# Patient Record
Sex: FEMALE | URBAN - METROPOLITAN AREA
[De-identification: names, ages, dates, MRNs, and addresses within clinical notes are randomized per-mention and may not be internally consistent; named-entity substitution may affect disease eponyms.]

---

## 2019-12-18 ENCOUNTER — TELEPHONE (OUTPATIENT)
Dept: GASTROENTEROLOGY | Age: 79
End: 2019-12-18

## 2024-06-24 ENCOUNTER — HOSPITAL ENCOUNTER (OUTPATIENT)
Age: 84
Setting detail: OBSERVATION
Discharge: HOME | End: 2024-06-26
Attending: EMERGENCY MEDICINE
Payer: MEDICARE

## 2024-06-24 ENCOUNTER — APPOINTMENT (OUTPATIENT)
Dept: CT IMAGING | Age: 84
End: 2024-06-24
Payer: MEDICARE

## 2024-06-24 DIAGNOSIS — K92.2 GASTROINTESTINAL HEMORRHAGE, UNSPECIFIED GASTROINTESTINAL HEMORRHAGE TYPE: Primary | ICD-10-CM

## 2024-06-24 PROBLEM — K62.5 BLOOD PER RECTUM: Status: ACTIVE | Noted: 2024-06-24

## 2024-06-24 LAB
ALBUMIN SERPL-MCNC: 4.5 G/DL (ref 3.5–5.2)
ALBUMIN/GLOB SERPL: 1.3 {RATIO} (ref 1–2.5)
ALP SERPL-CCNC: 78 U/L (ref 35–104)
ALT SERPL-CCNC: 15 U/L (ref 5–33)
ANION GAP SERPL CALCULATED.3IONS-SCNC: 12 MMOL/L (ref 9–17)
AST SERPL-CCNC: 16 U/L
BASOPHILS # BLD: 0.1 K/UL (ref 0–0.2)
BASOPHILS NFR BLD: 1 % (ref 0–2)
BILIRUB SERPL-MCNC: 0.4 MG/DL (ref 0.3–1.2)
BUN SERPL-MCNC: 17 MG/DL (ref 8–23)
CALCIUM SERPL-MCNC: 10.2 MG/DL (ref 8.6–10.4)
CHLORIDE SERPL-SCNC: 104 MMOL/L (ref 98–107)
CO2 SERPL-SCNC: 26 MMOL/L (ref 20–31)
CREAT SERPL-MCNC: 0.7 MG/DL (ref 0.5–0.9)
EOSINOPHIL # BLD: 0.1 K/UL (ref 0–0.4)
EOSINOPHILS RELATIVE PERCENT: 1 % (ref 1–4)
ERYTHROCYTE [DISTWIDTH] IN BLOOD BY AUTOMATED COUNT: 15.5 % (ref 12.5–15.4)
GFR, ESTIMATED: 85 ML/MIN/1.73M2
GLUCOSE SERPL-MCNC: 112 MG/DL (ref 70–99)
HCT VFR BLD AUTO: 42.3 % (ref 36–46)
HGB BLD-MCNC: 14.4 G/DL (ref 12–16)
LIPASE SERPL-CCNC: 10 U/L (ref 13–60)
LYMPHOCYTES NFR BLD: 1.6 K/UL (ref 1–4.8)
LYMPHOCYTES RELATIVE PERCENT: 18 % (ref 24–44)
MCH RBC QN AUTO: 27.3 PG (ref 26–34)
MCHC RBC AUTO-ENTMCNC: 33.9 G/DL (ref 31–37)
MCV RBC AUTO: 80.4 FL (ref 80–100)
MONOCYTES NFR BLD: 0.8 K/UL (ref 0.1–1.2)
MONOCYTES NFR BLD: 9 % (ref 2–11)
NEUTROPHILS NFR BLD: 71 % (ref 36–66)
NEUTS SEG NFR BLD: 6.1 K/UL (ref 1.8–7.7)
PLATELET # BLD AUTO: 234 K/UL (ref 140–450)
PMV BLD AUTO: 7.2 FL (ref 6–12)
POTASSIUM SERPL-SCNC: 4.4 MMOL/L (ref 3.7–5.3)
PROT SERPL-MCNC: 8.1 G/DL (ref 6.4–8.3)
RBC # BLD AUTO: 5.27 M/UL (ref 4–5.2)
SODIUM SERPL-SCNC: 142 MMOL/L (ref 135–144)
TROPONIN I SERPL HS-MCNC: 13 NG/L (ref 0–14)
WBC OTHER # BLD: 8.7 K/UL (ref 3.5–11)

## 2024-06-24 PROCEDURE — 6360000002 HC RX W HCPCS: Performed by: EMERGENCY MEDICINE

## 2024-06-24 PROCEDURE — 2580000003 HC RX 258: Performed by: EMERGENCY MEDICINE

## 2024-06-24 PROCEDURE — 85025 COMPLETE CBC W/AUTO DIFF WBC: CPT

## 2024-06-24 PROCEDURE — 6360000004 HC RX CONTRAST MEDICATION

## 2024-06-24 PROCEDURE — 36415 COLL VENOUS BLD VENIPUNCTURE: CPT

## 2024-06-24 PROCEDURE — 99285 EMERGENCY DEPT VISIT HI MDM: CPT

## 2024-06-24 PROCEDURE — 2580000003 HC RX 258

## 2024-06-24 PROCEDURE — 83690 ASSAY OF LIPASE: CPT

## 2024-06-24 PROCEDURE — 74177 CT ABD & PELVIS W/CONTRAST: CPT

## 2024-06-24 PROCEDURE — 93005 ELECTROCARDIOGRAM TRACING: CPT | Performed by: EMERGENCY MEDICINE

## 2024-06-24 PROCEDURE — 80053 COMPREHEN METABOLIC PANEL: CPT

## 2024-06-24 PROCEDURE — 84484 ASSAY OF TROPONIN QUANT: CPT

## 2024-06-24 RX ORDER — LOPERAMIDE HYDROCHLORIDE 2 MG/1
2 CAPSULE ORAL PRN
COMMUNITY

## 2024-06-24 RX ORDER — GLIMEPIRIDE 2 MG/1
2 TABLET ORAL 2 TIMES DAILY
COMMUNITY

## 2024-06-24 RX ORDER — SODIUM CHLORIDE 0.9 % (FLUSH) 0.9 %
10 SYRINGE (ML) INJECTION 2 TIMES DAILY
Status: DISCONTINUED | OUTPATIENT
Start: 2024-06-24 | End: 2024-06-26 | Stop reason: HOSPADM

## 2024-06-24 RX ORDER — 0.9 % SODIUM CHLORIDE 0.9 %
1000 INTRAVENOUS SOLUTION INTRAVENOUS ONCE
Status: COMPLETED | OUTPATIENT
Start: 2024-06-24 | End: 2024-06-24

## 2024-06-24 RX ORDER — 0.9 % SODIUM CHLORIDE 0.9 %
80 INTRAVENOUS SOLUTION INTRAVENOUS ONCE
Status: DISCONTINUED | OUTPATIENT
Start: 2024-06-24 | End: 2024-06-26

## 2024-06-24 RX ORDER — METOPROLOL SUCCINATE 50 MG/1
50 TABLET, EXTENDED RELEASE ORAL DAILY
Status: ON HOLD | COMMUNITY
End: 2024-06-25

## 2024-06-24 RX ORDER — AMLODIPINE BESYLATE 2.5 MG/1
2.5 TABLET ORAL DAILY
COMMUNITY

## 2024-06-24 RX ORDER — VALSARTAN 320 MG/1
320 TABLET ORAL DAILY
Status: ON HOLD | COMMUNITY
End: 2024-06-26 | Stop reason: HOSPADM

## 2024-06-24 RX ORDER — ASPIRIN 81 MG/1
81 TABLET, CHEWABLE ORAL DAILY
COMMUNITY

## 2024-06-24 RX ADMIN — Medication 80 ML: at 20:43

## 2024-06-24 RX ADMIN — SODIUM CHLORIDE 1000 ML: 9 INJECTION, SOLUTION INTRAVENOUS at 20:34

## 2024-06-24 RX ADMIN — IOPAMIDOL 75 ML: 755 INJECTION, SOLUTION INTRAVENOUS at 20:42

## 2024-06-24 RX ADMIN — PIPERACILLIN AND TAZOBACTAM 3375 MG: 3; .375 INJECTION, POWDER, LYOPHILIZED, FOR SOLUTION INTRAVENOUS at 23:53

## 2024-06-24 RX ADMIN — SODIUM CHLORIDE, PRESERVATIVE FREE 10 ML: 5 INJECTION INTRAVENOUS at 20:43

## 2024-06-24 ASSESSMENT — ENCOUNTER SYMPTOMS
EYES NEGATIVE: 1
CONSTIPATION: 0
VOMITING: 0
DIARRHEA: 1
ABDOMINAL DISTENTION: 0
RECTAL PAIN: 0
NAUSEA: 1
ABDOMINAL PAIN: 0
ALLERGIC/IMMUNOLOGIC NEGATIVE: 1
RESPIRATORY NEGATIVE: 1
BLOOD IN STOOL: 1
ANAL BLEEDING: 0

## 2024-06-24 ASSESSMENT — PAIN - FUNCTIONAL ASSESSMENT: PAIN_FUNCTIONAL_ASSESSMENT: NONE - DENIES PAIN

## 2024-06-24 NOTE — ED PROVIDER NOTES
[address]  Emergency Department Encounter  Mid Level Provider     Pt Name: Barbie Arevalo  MRN: [id number]  Birthdate 1940  Date of evaluation: 6/24/24  PCP:  Mark Bautista MD    CHIEF COMPLAINT       Chief Complaint   Patient presents with    Fatigue     Onset about 5 days ago.  Pt complains of feeling generalized weakness.  Also, pt describes rectal bleeding x1 event today with bowel movement.        HISTORY OF PRESENT ILLNESS  (Location/Symptom, Timing/Onset,Context/Setting, Quality, Duration, Modifying Factors, Severity.)      Barbie Arevalo is a 83 y.o. female who presents with complaints of fatigue and feeling unwell, chills, without fever, no nausea, intermittent myalgias. She has also had increase in diarrhea, today she noted kailyn red blood mixed with her stool, she reports it filled the entire toilet bowl.     She also notes a decrease in appetite as well as decrease in urination, denies any hematuria or dysuria.     She has history of colorectal cancer with chemo/radiation and partial colectomy 20 years ago.     PAST MEDICAL /SURGICAL / SOCIAL / FAMILY HISTORY      has a past medical history of Diabetes 1.5, managed as type 2 (HCC) and Hypertension.     has no past surgical history on file.    Social History     Socioeconomic History    Marital status: Unknown     Spouse name: Not on file    Number of children: Not on file    Years of education: Not on file    Highest education level: Not on file   Occupational History    Not on file   Tobacco Use    Smoking status: Never    Smokeless tobacco: Never   Vaping Use    Vaping Use: Never used   Substance and Sexual Activity    Alcohol use: Not on file    Drug use: Never    Sexual activity: Not on file   Other Topics Concern    Not on file   Social History Narrative    Not on file     Social Determinants of Health     Financial Resource Strain: Not on file   Food Insecurity: No Food Insecurity (6/25/2024)    Hunger Vital Sign     Worried  physician who will make final medical decision making, consultation, reevaluation and disposition.    Plan (Labs/Imaging/EKG):  Orders Placed This Encounter   Procedures    CT ABDOMEN PELVIS W IV CONTRAST Additional Contrast? None    MRI ABDOMEN W WO CONTRAST    CMP    CBC with Auto Differential    Lipase    Iron and TIBC    Basic Metabolic Panel w/ Reflex to MG    Hemoglobin and Hematocrit    Hemoglobin A1c    Basic Metabolic Panel w/ Reflex to MG    CBC with Auto Differential    CEA    Inpatient consult to GI    POC Glucose Fingerstick    POC Glucose Fingerstick    POC Glucose Fingerstick    POC Glucose Fingerstick    POC Glucose Fingerstick    EKG 12 Lead    Saline lock IV    Place in Observation Service    Discharge patient       Medications Ordered:  Orders Placed This Encounter   Medications    sodium chloride 0.9 % bolus 1,000 mL    DISCONTD: sodium chloride 0.9 % bolus 80 mL    iopamidol (ISOVUE-370) 76 % injection 75 mL    DISCONTD: sodium chloride flush 0.9 % injection 10 mL    piperacillin-tazobactam (ZOSYN) 3,375 mg in sodium chloride 0.9 % 50 mL IVPB (mini-bag)     Order Specific Question:   Antimicrobial Indications     Answer:   Intra-Abdominal Infection    DISCONTD: sodium chloride flush 0.9 % injection 5-40 mL    DISCONTD: sodium chloride flush 0.9 % injection 5-40 mL    DISCONTD: 0.9 % sodium chloride infusion    DISCONTD: ondansetron (ZOFRAN-ODT) disintegrating tablet 4 mg    DISCONTD: ondansetron (ZOFRAN) injection 4 mg    DISCONTD: acetaminophen (TYLENOL) tablet 650 mg    DISCONTD: acetaminophen (TYLENOL) suppository 650 mg    DISCONTD: glucose chewable tablet 16 g    DISCONTD: dextrose bolus 10% 125 mL    DISCONTD: dextrose bolus 10% 250 mL    DISCONTD: glucagon injection 1 mg    DISCONTD: dextrose 10 % infusion    DISCONTD: insulin lispro (HUMALOG,ADMELOG) injection vial 0-4 Units    DISCONTD: hydrALAZINE (APRESOLINE) injection 10 mg    DISCONTD: pantoprazole (PROTONIX) 40 mg in sodium

## 2024-06-25 ENCOUNTER — APPOINTMENT (OUTPATIENT)
Dept: MRI IMAGING | Age: 84
End: 2024-06-25
Payer: MEDICARE

## 2024-06-25 PROBLEM — K92.2 GASTROINTESTINAL HEMORRHAGE: Status: ACTIVE | Noted: 2024-06-25

## 2024-06-25 PROBLEM — E11.9 TYPE 2 DIABETES MELLITUS (HCC): Status: ACTIVE | Noted: 2024-06-25

## 2024-06-25 PROBLEM — E78.2 MIXED HYPERLIPIDEMIA: Status: ACTIVE | Noted: 2024-06-25

## 2024-06-25 PROBLEM — I49.1 SUPRAVENTRICULAR PREMATURE BEATS: Status: ACTIVE | Noted: 2024-06-25

## 2024-06-25 PROBLEM — K58.0 IRRITABLE BOWEL SYNDROME WITH DIARRHEA: Status: ACTIVE | Noted: 2024-06-25

## 2024-06-25 PROBLEM — E11.65 TYPE 2 DIABETES MELLITUS WITH HYPERGLYCEMIA (HCC): Status: ACTIVE | Noted: 2024-06-25

## 2024-06-25 PROBLEM — I10 PRIMARY HYPERTENSION: Status: ACTIVE | Noted: 2024-06-25

## 2024-06-25 PROBLEM — M19.90 ARTHRITIS: Status: ACTIVE | Noted: 2024-06-25

## 2024-06-25 PROBLEM — F41.9 ANXIETY: Status: ACTIVE | Noted: 2024-06-25

## 2024-06-25 LAB
ANION GAP SERPL CALCULATED.3IONS-SCNC: 12 MMOL/L (ref 9–17)
BUN SERPL-MCNC: 14 MG/DL (ref 8–23)
CALCIUM SERPL-MCNC: 9.2 MG/DL (ref 8.6–10.4)
CHLORIDE SERPL-SCNC: 104 MMOL/L (ref 98–107)
CO2 SERPL-SCNC: 26 MMOL/L (ref 20–31)
CREAT SERPL-MCNC: 0.7 MG/DL (ref 0.5–0.9)
EKG Q-T INTERVAL: 438 MS
EKG QRS DURATION: 92 MS
EKG QTC CALCULATION (BAZETT): 455 MS
EKG R AXIS: -5 DEGREES
EKG T AXIS: 64 DEGREES
EKG VENTRICULAR RATE: 65 BPM
EST. AVERAGE GLUCOSE BLD GHB EST-MCNC: 140 MG/DL
GFR, ESTIMATED: 85 ML/MIN/1.73M2
GLUCOSE BLD-MCNC: 130 MG/DL (ref 65–105)
GLUCOSE BLD-MCNC: 140 MG/DL (ref 65–105)
GLUCOSE SERPL-MCNC: 135 MG/DL (ref 70–99)
HBA1C MFR BLD: 6.5 % (ref 4–6)
HCT VFR BLD AUTO: 39.8 % (ref 36–46)
HCT VFR BLD AUTO: 40 % (ref 36–46)
HCT VFR BLD AUTO: 41.6 % (ref 36–46)
HGB BLD-MCNC: 13.4 G/DL (ref 12–16)
HGB BLD-MCNC: 13.5 G/DL (ref 12–16)
HGB BLD-MCNC: 13.7 G/DL (ref 12–16)
IRON SATN MFR SERPL: 23 % (ref 20–55)
IRON SERPL-MCNC: 62 UG/DL (ref 37–145)
POTASSIUM SERPL-SCNC: 4.1 MMOL/L (ref 3.7–5.3)
SODIUM SERPL-SCNC: 142 MMOL/L (ref 135–144)
TIBC SERPL-MCNC: 268 UG/DL (ref 250–450)
UNSATURATED IRON BINDING CAPACITY: 206 UG/DL (ref 112–347)

## 2024-06-25 PROCEDURE — 99222 1ST HOSP IP/OBS MODERATE 55: CPT | Performed by: NURSE PRACTITIONER

## 2024-06-25 PROCEDURE — 96375 TX/PRO/DX INJ NEW DRUG ADDON: CPT

## 2024-06-25 PROCEDURE — 96365 THER/PROPH/DIAG IV INF INIT: CPT

## 2024-06-25 PROCEDURE — G0378 HOSPITAL OBSERVATION PER HR: HCPCS

## 2024-06-25 PROCEDURE — A9579 GAD-BASE MR CONTRAST NOS,1ML: HCPCS | Performed by: STUDENT IN AN ORGANIZED HEALTH CARE EDUCATION/TRAINING PROGRAM

## 2024-06-25 PROCEDURE — APPSS30 APP SPLIT SHARED TIME 16-30 MINUTES

## 2024-06-25 PROCEDURE — C9113 INJ PANTOPRAZOLE SODIUM, VIA: HCPCS

## 2024-06-25 PROCEDURE — 83550 IRON BINDING TEST: CPT

## 2024-06-25 PROCEDURE — 6360000002 HC RX W HCPCS

## 2024-06-25 PROCEDURE — 74183 MRI ABD W/O CNTR FLWD CNTR: CPT

## 2024-06-25 PROCEDURE — 80048 BASIC METABOLIC PNL TOTAL CA: CPT

## 2024-06-25 PROCEDURE — 85018 HEMOGLOBIN: CPT

## 2024-06-25 PROCEDURE — 96366 THER/PROPH/DIAG IV INF ADDON: CPT

## 2024-06-25 PROCEDURE — 82947 ASSAY GLUCOSE BLOOD QUANT: CPT

## 2024-06-25 PROCEDURE — 83540 ASSAY OF IRON: CPT

## 2024-06-25 PROCEDURE — 6360000004 HC RX CONTRAST MEDICATION: Performed by: STUDENT IN AN ORGANIZED HEALTH CARE EDUCATION/TRAINING PROGRAM

## 2024-06-25 PROCEDURE — 83036 HEMOGLOBIN GLYCOSYLATED A1C: CPT

## 2024-06-25 PROCEDURE — 2580000003 HC RX 258

## 2024-06-25 PROCEDURE — 99222 1ST HOSP IP/OBS MODERATE 55: CPT | Performed by: STUDENT IN AN ORGANIZED HEALTH CARE EDUCATION/TRAINING PROGRAM

## 2024-06-25 PROCEDURE — 85014 HEMATOCRIT: CPT

## 2024-06-25 PROCEDURE — 2580000003 HC RX 258: Performed by: STUDENT IN AN ORGANIZED HEALTH CARE EDUCATION/TRAINING PROGRAM

## 2024-06-25 PROCEDURE — 6370000000 HC RX 637 (ALT 250 FOR IP): Performed by: STUDENT IN AN ORGANIZED HEALTH CARE EDUCATION/TRAINING PROGRAM

## 2024-06-25 PROCEDURE — 36415 COLL VENOUS BLD VENIPUNCTURE: CPT

## 2024-06-25 RX ORDER — METOPROLOL TARTRATE 50 MG/1
50 TABLET, FILM COATED ORAL 2 TIMES DAILY
Status: DISCONTINUED | OUTPATIENT
Start: 2024-06-25 | End: 2024-06-26 | Stop reason: HOSPADM

## 2024-06-25 RX ORDER — INSULIN LISPRO 100 [IU]/ML
0-4 INJECTION, SOLUTION INTRAVENOUS; SUBCUTANEOUS EVERY 4 HOURS
Status: DISCONTINUED | OUTPATIENT
Start: 2024-06-25 | End: 2024-06-26 | Stop reason: HOSPADM

## 2024-06-25 RX ORDER — SODIUM CHLORIDE 0.9 % (FLUSH) 0.9 %
5-40 SYRINGE (ML) INJECTION EVERY 12 HOURS SCHEDULED
Status: DISCONTINUED | OUTPATIENT
Start: 2024-06-25 | End: 2024-06-26 | Stop reason: HOSPADM

## 2024-06-25 RX ORDER — GLUCAGON 1 MG/ML
1 KIT INJECTION PRN
Status: DISCONTINUED | OUTPATIENT
Start: 2024-06-25 | End: 2024-06-26 | Stop reason: HOSPADM

## 2024-06-25 RX ORDER — SODIUM CHLORIDE 0.9 % (FLUSH) 0.9 %
5-40 SYRINGE (ML) INJECTION PRN
Status: DISCONTINUED | OUTPATIENT
Start: 2024-06-25 | End: 2024-06-26 | Stop reason: HOSPADM

## 2024-06-25 RX ORDER — SODIUM CHLORIDE 9 MG/ML
INJECTION, SOLUTION INTRAVENOUS PRN
Status: DISCONTINUED | OUTPATIENT
Start: 2024-06-25 | End: 2024-06-26 | Stop reason: HOSPADM

## 2024-06-25 RX ORDER — ACETAMINOPHEN 650 MG/1
650 SUPPOSITORY RECTAL EVERY 6 HOURS PRN
Status: DISCONTINUED | OUTPATIENT
Start: 2024-06-25 | End: 2024-06-26 | Stop reason: HOSPADM

## 2024-06-25 RX ORDER — 0.9 % SODIUM CHLORIDE 0.9 %
100 INTRAVENOUS SOLUTION INTRAVENOUS ONCE
Status: DISCONTINUED | OUTPATIENT
Start: 2024-06-25 | End: 2024-06-26

## 2024-06-25 RX ORDER — AMLODIPINE BESYLATE 5 MG/1
2.5 TABLET ORAL DAILY
Status: DISCONTINUED | OUTPATIENT
Start: 2024-06-25 | End: 2024-06-26 | Stop reason: HOSPADM

## 2024-06-25 RX ORDER — ONDANSETRON 2 MG/ML
4 INJECTION INTRAMUSCULAR; INTRAVENOUS EVERY 6 HOURS PRN
Status: DISCONTINUED | OUTPATIENT
Start: 2024-06-25 | End: 2024-06-26 | Stop reason: HOSPADM

## 2024-06-25 RX ORDER — SODIUM CHLORIDE 0.9 % (FLUSH) 0.9 %
10 SYRINGE (ML) INJECTION ONCE
Status: COMPLETED | OUTPATIENT
Start: 2024-06-25 | End: 2024-06-25

## 2024-06-25 RX ORDER — HYDRALAZINE HYDROCHLORIDE 20 MG/ML
10 INJECTION INTRAMUSCULAR; INTRAVENOUS EVERY 6 HOURS PRN
Status: DISCONTINUED | OUTPATIENT
Start: 2024-06-25 | End: 2024-06-26 | Stop reason: HOSPADM

## 2024-06-25 RX ORDER — METOPROLOL TARTRATE 50 MG/1
50 TABLET, FILM COATED ORAL 2 TIMES DAILY
COMMUNITY

## 2024-06-25 RX ORDER — ACETAMINOPHEN 325 MG/1
650 TABLET ORAL EVERY 6 HOURS PRN
Status: DISCONTINUED | OUTPATIENT
Start: 2024-06-25 | End: 2024-06-26 | Stop reason: HOSPADM

## 2024-06-25 RX ORDER — DEXTROSE MONOHYDRATE 100 MG/ML
INJECTION, SOLUTION INTRAVENOUS CONTINUOUS PRN
Status: DISCONTINUED | OUTPATIENT
Start: 2024-06-25 | End: 2024-06-26 | Stop reason: HOSPADM

## 2024-06-25 RX ORDER — ONDANSETRON 4 MG/1
4 TABLET, ORALLY DISINTEGRATING ORAL EVERY 8 HOURS PRN
Status: DISCONTINUED | OUTPATIENT
Start: 2024-06-25 | End: 2024-06-26 | Stop reason: HOSPADM

## 2024-06-25 RX ADMIN — PIPERACILLIN AND TAZOBACTAM 3375 MG: 3; .375 INJECTION, POWDER, LYOPHILIZED, FOR SOLUTION INTRAVENOUS at 08:54

## 2024-06-25 RX ADMIN — HYDRALAZINE HYDROCHLORIDE 10 MG: 20 INJECTION INTRAMUSCULAR; INTRAVENOUS at 13:16

## 2024-06-25 RX ADMIN — GADOTERIDOL 13 ML: 279.3 INJECTION, SOLUTION INTRAVENOUS at 11:25

## 2024-06-25 RX ADMIN — PANTOPRAZOLE SODIUM 40 MG: 40 INJECTION, POWDER, FOR SOLUTION INTRAVENOUS at 08:54

## 2024-06-25 RX ADMIN — PIPERACILLIN AND TAZOBACTAM 3375 MG: 3; .375 INJECTION, POWDER, LYOPHILIZED, FOR SOLUTION INTRAVENOUS at 17:47

## 2024-06-25 RX ADMIN — AMLODIPINE BESYLATE 2.5 MG: 5 TABLET ORAL at 14:09

## 2024-06-25 RX ADMIN — METOPROLOL TARTRATE 50 MG: 50 TABLET, FILM COATED ORAL at 14:09

## 2024-06-25 RX ADMIN — SODIUM CHLORIDE, PRESERVATIVE FREE 10 ML: 5 INJECTION INTRAVENOUS at 11:24

## 2024-06-25 RX ADMIN — METOPROLOL TARTRATE 50 MG: 50 TABLET, FILM COATED ORAL at 21:04

## 2024-06-25 RX ADMIN — Medication 100 ML: at 11:26

## 2024-06-25 NOTE — ED PROVIDER NOTES
[facility] Emergency Department  [address]  [facility] [address]  Phone: [phone]  Fax: [phone]      Attending Physician Attestation    I performed a history and physical examination of the patient and discussed management with the mid level provider. I reviewed the mid level provider's note and agree with the documented findings and plan of care. Any areas of disagreement are noted on the chart. I was personally present for the key portions of any procedures. I have documented in the chart those procedures where I was not present during the key portions. I have reviewed the emergency nurses triage note. I agree with the chief complaint, past medical history, past surgical history, allergies, medications, social and family history as documented unless otherwise noted below. Documentation of the HPI, Physical Exam and Medical Decision Making performed by mid level providers is based on my personal performance of the HPI, PE and MDM. For Physician Assistant/ Nurse Practitioner cases/documentation I have personally evaluated this patient and have completed at least one if not all key elements of the E/M (history, physical exam, and MDM). Additional findings are as noted.      CHIEF COMPLAINT       Chief Complaint   Patient presents with    Fatigue     Onset about 5 days ago.  Pt complains of feeling generalized weakness.  Also, pt describes rectal bleeding x1 event today with bowel movement.         PAST MEDICAL HISTORY     Past Medical History:   Diagnosis Date    Diabetes 1.5, managed as type 2 (HCC)     Hypertension        SURGICAL HISTORY      has no past surgical history on file.    CURRENT MEDICATIONS       Previous Medications    AMLODIPINE (NORVASC) 2.5 MG TABLET    Take 1 tablet by mouth daily    ASPIRIN 81 MG CHEWABLE TABLET    Take 1 tablet by mouth daily    GLIMEPIRIDE (AMARYL) 2 MG TABLET    Take 1 tablet by mouth 2 times daily    LOPERAMIDE (IMODIUM) 2 MG  Gastrointestinal hemorrhage, unspecified gastrointestinal hemorrhage type        CONDITION ON DISPOSITION:   Stable         This note was created using Dragon dictation software. The note was briefly reviewed and proofread, but may contain some grammatical and phonetic errors.    Jon Barnes DO,  Emergency Medicine Physician       To, Jon CUELLO DO  06/25/24 0020

## 2024-06-25 NOTE — CONSULTS
Gastroenterology Consult Note    Patient:   Barbie Arevalo   Admit date:  6/24/2024  Facility:   [facility]  Referring/PCP: Mark Bautista MD  Date:     6/25/2024  Consultant:   DOT Amador NP    Subjective:     This 83 y.o. female was admitted 6/24/2024 with a diagnosis of \"Blood per rectum [K62.5]  Gastrointestinal hemorrhage, unspecified gastrointestinal hemorrhage type [K92.2]\" and is seen in consultation regarding   Chief Complaint   Patient presents with    Fatigue     Onset about 5 days ago.  Pt complains of feeling generalized weakness.  Also, pt describes rectal bleeding x1 event today with bowel movement.      83/F w/ pmhx of DM, HTN, colon cancer s/p resection 2004 s/p chemoradiation presents to ED with weakness, fatigue (onset 5 days ago).  Patient also reported rectal bleeding x 1 episode yesterday with bowel movement.  In the ED labs unremarkable.  Hgb 14.4    CT abd/pelvis:  1.  Diverticulosis.  Minimal fat stranding near the sigmoid colon for which  mild acute diverticulitis should be considered.  2.  Distal colonic anastomosis with nonspecific induration of the presacral  fat, which may be related to prior treatment in this area.  3.  Cholelithiasis without CT evidence for acute cholecystitis.  4.  Multiple indeterminate splenic lesions and indeterminate liver lesion.  In the absence of prior imaging to demonstrate stability of these findings,  further evaluation could be obtained with contrast-enhanced MRI.  5.  Additional chronic and benign findings, as described.    MRI:  1. Benign hepatic, splenic, and renal cysts.  2. Cholelithiasis.  3. Moderate hepatic steatosis.    Patient was started on Zosyn.    Repeat hgb today 13.5  Had a BM this morning which was brown but has scant bright red blood when wiping.  No c/o abdominal pain.  No fevers, chills, nausea, vomiting, chest pain, shortness of breath prior to admission.  No AC or AP  Denies NSAID use    Endoscopy  normal. No rashes or lesions  HEENT:   Neck: no adenopathy, no carotid bruit, no JVD, supple, symmetrical, trachea midline and thyroid not enlarged, symmetric, no tenderness/mass/nodules  Lungs: clear to auscultation bilaterally  Heart: regular rate and rhythm, S1, S2 normal, no murmur, click, rub or gallop  Abdomen: soft, non-tender; bowel sounds normal; no masses,  no organomegaly  Extremities: extremities normal, atraumatic, no cyanosis or edema  Neurologic: Mental status: Alert, oriented, thought content appropriate    Labs:  CBC:   Recent Labs     06/24/24 1937 06/25/24 0134 06/25/24  0723   WBC 8.7  --   --    HGB 14.4 13.5 13.4     --   --      BMP:    Recent Labs     06/24/24 1937 06/25/24 0134    142   K 4.4 4.1    104   CO2 26 26   BUN 17 14   CREATININE 0.7 0.7   GLUCOSE 112* 135*     Hepatic:   Recent Labs     06/24/24 1937   AST 16   ALT 15   BILITOT 0.4   ALKPHOS 78     Troponin: No results for input(s): \"TROPONINI\" in the last 72 hours.  BNP: No results for input(s): \"BNP\" in the last 72 hours.  Lipids: No results for input(s): \"CHOL\", \"HDL\" in the last 72 hours.    Invalid input(s): \"LDLCALCU\"  ABGs: No results found for: \"PHART\", \"PO2ART\", \"YUY4DKG\"  INR: No results for input(s): \"INR\" in the last 72 hours.    Recent Labs     06/24/24 1937 06/25/24 0134 06/25/24  0723   WBC 8.7  --   --    HGB 14.4 13.5 13.4   MCV 80.4  --   --      --   --     142  --    K 4.4 4.1  --     104  --    CO2 26 26  --    BUN 17 14  --    CREATININE 0.7 0.7  --    GLUCOSE 112* 135*  --    CALCIUM 10.2 9.2  --    AST 16  --   --    ALT 15  --   --    ALKPHOS 78  --   --    BILITOT 0.4  --   --    LIPASE 10*  --   --      Assessment:   Principal Problem:    Blood per rectum  Active Problems:    Primary hypertension    Type 2 diabetes mellitus (HCC)  Resolved Problems:    * No resolved hospital problems. *        Plan:   83/F w/ pmhx of DM, HTN, colon cancer s/p resection,

## 2024-06-25 NOTE — H&P
being evaluated for bright red blood per rectum.  Ms. Arevalo states that for the last few days she has been feeling weak, and lightheaded, and today she experienced one moderately sized bright red bloody stool, so presented to the ER for further evaluation.  In the ER,   the patient was afebrile, hypertensive, labs: Na: 142, K: 4.4, BUN/Crt: 1/0.7, troponin: 13, WBC: 8.7, Hgb: 14.4, Plts: 234. CT abdomen: Diverticulosis.  Minimal fat stranding near the sigmoid colon for which  mild acute diverticulitis should be considered.  Distal colonic anastomosis with nonspecific induration of the presacral fat, which may be related to prior treatment in this area.  Cholelithiasis without CT evidence for acute cholecystitis.  Multiple indeterminate splenic lesions and indeterminate liver lesion.  In the absence of prior imaging to demonstrate stability of these findings, further evaluation could be obtained with contrast-enhanced MRI.  Additional chronic and benign findings, as described.    Upon exam, he patient is resting in bed on room air.  She is alert and oriented x 3 and in no acute distress. She denies any pain, abdominal pain, nausea, vomiting, chest pain, shortness of breath, abdominal pain, or dysuria.  She denies any more blood stools since the on she experienced at home.  She denies any other signs or symptoms of bleeding.  She denies any blood thinner use.  She states her last colonoscopy was less than one year ago.  We will admit the patient for further monitoring and treatment.  The plan of care was discussed with the patient who agrees to proceed.     Past Medical History:     Past Medical History:   Diagnosis Date    Diabetes 1.5, managed as type 2 (HCC)     Hypertension         Past Surgical History:     History reviewed. No pertinent surgical history.     Medications Prior to Admission:     Prior to Admission medications    Medication Sig Start Date End Date Taking? Authorizing Provider   metoprolol  PM   Result Value Ref Range    Ventricular Rate 65 BPM    QRS Duration 92 ms    Q-T Interval 438 ms    QTc Calculation (Bazett) 455 ms    R Axis -5 degrees    T Axis 64 degrees   Hemoglobin and Hematocrit    Collection Time: 06/25/24  1:34 AM   Result Value Ref Range    Hemoglobin 13.5 12.0 - 16.0 g/dL    Hematocrit 40.0 36 - 46 %   Basic Metabolic Panel w/ Reflex to MG    Collection Time: 06/25/24  1:34 AM   Result Value Ref Range    Sodium 142 135 - 144 mmol/L    Potassium 4.1 3.7 - 5.3 mmol/L    Chloride 104 98 - 107 mmol/L    CO2 26 20 - 31 mmol/L    Anion Gap 12 9 - 17 mmol/L    Glucose 135 (H) 70 - 99 mg/dL    BUN 14 8 - 23 mg/dL    Creatinine 0.7 0.5 - 0.9 mg/dL    Est, Glom Filt Rate 85 >60 mL/min/1.73m2    Calcium 9.2 8.6 - 10.4 mg/dL       Imaging/Diagnostics:  CT ABDOMEN PELVIS W IV CONTRAST Additional Contrast? None    Result Date: 6/24/2024  1.  Diverticulosis.  Minimal fat stranding near the sigmoid colon for which mild acute diverticulitis should be considered. 2.  Distal colonic anastomosis with nonspecific induration of the presacral fat, which may be related to prior treatment in this area. 3.  Cholelithiasis without CT evidence for acute cholecystitis. 4.  Multiple indeterminate splenic lesions and indeterminate liver lesion. In the absence of prior imaging to demonstrate stability of these findings, further evaluation could be obtained with contrast-enhanced MRI. 5.  Additional chronic and benign findings, as described.       Assessment :      Hospital Problems             Last Modified POA    * (Principal) Blood per rectum 6/24/2024 Yes    Primary hypertension 6/25/2024 Yes    Type 2 diabetes mellitus (HCC) 6/25/2024 Yes       Plan:     Blood per rectum   A. CT of the abdomen shows diverticulosis with the possibility of diverticulitis, continue Zosyn for now   B. Will consult GI for their input   C. Keep NPO for now pending GI evaluation   D. Continue PPI    2. Essential Hypertension   A. BP

## 2024-06-25 NOTE — PLAN OF CARE
Problem: Discharge Planning  Goal: Discharge to home or other facility with appropriate resources  6/25/2024 1006 by Reyes, Abigail, RN  Outcome: Progressing  6/25/2024 0146 by Anamaria Merrill RN  Outcome: Progressing     Problem: Safety - Adult  Goal: Free from fall injury  6/25/2024 1006 by Reyes, Abigail, RN  Outcome: Progressing  6/25/2024 0146 by Anamaria Merrill RN  Outcome: Progressing     Problem: Hematologic - Adult  Goal: Maintains hematologic stability  6/25/2024 1006 by Reyes, Abigail, RN  Outcome: Progressing  6/25/2024 0146 by Anamaria Merrill RN  Outcome: Progressing

## 2024-06-25 NOTE — CARE COORDINATION
Case Management Assessment  Initial Evaluation    Date/Time of Evaluation: 6/25/2024 3:12 PM  Assessment Completed by: JOE Metz, GILBERTW    If patient is discharged prior to next notation, then this note serves as note for discharge by case management.    Patient Name: Barbie Arevalo                   YOB: 1940  Diagnosis: Blood per rectum [K62.5]  Gastrointestinal hemorrhage, unspecified gastrointestinal hemorrhage type [K92.2]                   Date / Time: 6/24/2024  6:33 PM    Patient Admission Status: Observation   Readmission Risk (Low < 19, Mod (19-27), High > 27): No data recorded  Current PCP: Mark Bautista MD  PCP verified by CM? Yes    Chart Reviewed: Yes      History Provided by: Patient  Patient Orientation: Alert and Oriented    Patient Cognition: Alert    Hospitalization in the last 30 days (Readmission):  No    If yes, Readmission Assessment in  Navigator will be completed.    Advance Directives:      Code Status: Full Code   Patient's Primary Decision Maker is: Legal Next of Kin      Discharge Planning:    Patient lives with: Alone Type of Home: House  Primary Care Giver: Self  Patient Support Systems include: Children, Family Members   Current Financial resources: Medicare  Current community resources: None  Current services prior to admission: None            Current DME:              Type of Home Care services:  None    ADLS  Prior functional level: Independent in ADLs/IADLs, Bathing, Dressing, Toileting, Feeding, Cooking, Housework, Shopping, Mobility  Current functional level: Independent in ADLs/IADLs, Bathing, Dressing, Toileting, Feeding, Cooking, Housework, Shopping, Mobility    PT AM-PAC:   /24  OT AM-PAC:   /24    Family can provide assistance at DC: Yes  Would you like Case Management to discuss the discharge plan with any other family members/significant others, and if so, who? Yes  Plans to Return to Present Housing: Yes  Other Identified Issues/Barriers

## 2024-06-26 VITALS
DIASTOLIC BLOOD PRESSURE: 74 MMHG | OXYGEN SATURATION: 98 % | HEIGHT: 61 IN | RESPIRATION RATE: 18 BRPM | TEMPERATURE: 97.5 F | SYSTOLIC BLOOD PRESSURE: 161 MMHG | HEART RATE: 64 BPM | WEIGHT: 149.69 LBS | BODY MASS INDEX: 28.26 KG/M2

## 2024-06-26 LAB
ANION GAP SERPL CALCULATED.3IONS-SCNC: 12 MMOL/L (ref 9–17)
BASOPHILS # BLD: 0 K/UL (ref 0–0.2)
BASOPHILS NFR BLD: 1 % (ref 0–2)
BUN SERPL-MCNC: 10 MG/DL (ref 8–23)
CALCIUM SERPL-MCNC: 9.6 MG/DL (ref 8.6–10.4)
CEA SERPL-MCNC: 1.5 NG/ML (ref 0–3.8)
CHLORIDE SERPL-SCNC: 106 MMOL/L (ref 98–107)
CO2 SERPL-SCNC: 25 MMOL/L (ref 20–31)
CREAT SERPL-MCNC: 0.8 MG/DL (ref 0.5–0.9)
EOSINOPHIL # BLD: 0.1 K/UL (ref 0–0.4)
EOSINOPHILS RELATIVE PERCENT: 1 % (ref 1–4)
ERYTHROCYTE [DISTWIDTH] IN BLOOD BY AUTOMATED COUNT: 15 % (ref 12.5–15.4)
GFR, ESTIMATED: 73 ML/MIN/1.73M2
GLUCOSE BLD-MCNC: 127 MG/DL (ref 65–105)
GLUCOSE BLD-MCNC: 129 MG/DL (ref 65–105)
GLUCOSE BLD-MCNC: 160 MG/DL (ref 65–105)
GLUCOSE SERPL-MCNC: 161 MG/DL (ref 70–99)
HCT VFR BLD AUTO: 41.9 % (ref 36–46)
HGB BLD-MCNC: 14.3 G/DL (ref 12–16)
LYMPHOCYTES NFR BLD: 0.9 K/UL (ref 1–4.8)
LYMPHOCYTES RELATIVE PERCENT: 14 % (ref 24–44)
MCH RBC QN AUTO: 27.5 PG (ref 26–34)
MCHC RBC AUTO-ENTMCNC: 34 G/DL (ref 31–37)
MCV RBC AUTO: 80.7 FL (ref 80–100)
MONOCYTES NFR BLD: 0.7 K/UL (ref 0.1–1.2)
MONOCYTES NFR BLD: 10 % (ref 2–11)
NEUTROPHILS NFR BLD: 74 % (ref 36–66)
NEUTS SEG NFR BLD: 4.8 K/UL (ref 1.8–7.7)
PLATELET # BLD AUTO: 222 K/UL (ref 140–450)
PMV BLD AUTO: 7.1 FL (ref 6–12)
POTASSIUM SERPL-SCNC: 4 MMOL/L (ref 3.7–5.3)
RBC # BLD AUTO: 5.19 M/UL (ref 4–5.2)
SODIUM SERPL-SCNC: 143 MMOL/L (ref 135–144)
WBC OTHER # BLD: 6.5 K/UL (ref 3.5–11)

## 2024-06-26 PROCEDURE — 2580000003 HC RX 258

## 2024-06-26 PROCEDURE — 6370000000 HC RX 637 (ALT 250 FOR IP): Performed by: STUDENT IN AN ORGANIZED HEALTH CARE EDUCATION/TRAINING PROGRAM

## 2024-06-26 PROCEDURE — 82947 ASSAY GLUCOSE BLOOD QUANT: CPT

## 2024-06-26 PROCEDURE — 96366 THER/PROPH/DIAG IV INF ADDON: CPT

## 2024-06-26 PROCEDURE — 36415 COLL VENOUS BLD VENIPUNCTURE: CPT

## 2024-06-26 PROCEDURE — 85025 COMPLETE CBC W/AUTO DIFF WBC: CPT

## 2024-06-26 PROCEDURE — 99232 SBSQ HOSP IP/OBS MODERATE 35: CPT | Performed by: NURSE PRACTITIONER

## 2024-06-26 PROCEDURE — G0378 HOSPITAL OBSERVATION PER HR: HCPCS

## 2024-06-26 PROCEDURE — C9113 INJ PANTOPRAZOLE SODIUM, VIA: HCPCS

## 2024-06-26 PROCEDURE — 82378 CARCINOEMBRYONIC ANTIGEN: CPT

## 2024-06-26 PROCEDURE — 80048 BASIC METABOLIC PNL TOTAL CA: CPT

## 2024-06-26 PROCEDURE — 99232 SBSQ HOSP IP/OBS MODERATE 35: CPT | Performed by: STUDENT IN AN ORGANIZED HEALTH CARE EDUCATION/TRAINING PROGRAM

## 2024-06-26 PROCEDURE — 6360000002 HC RX W HCPCS

## 2024-06-26 PROCEDURE — 96376 TX/PRO/DX INJ SAME DRUG ADON: CPT

## 2024-06-26 RX ORDER — VALSARTAN 160 MG/1
160 TABLET ORAL DAILY
Status: DISCONTINUED | OUTPATIENT
Start: 2024-06-26 | End: 2024-06-26 | Stop reason: HOSPADM

## 2024-06-26 RX ORDER — CIPROFLOXACIN 500 MG/1
500 TABLET, FILM COATED ORAL 2 TIMES DAILY
Qty: 8 TABLET | Refills: 0 | Status: SHIPPED | OUTPATIENT
Start: 2024-06-26 | End: 2024-06-30

## 2024-06-26 RX ORDER — VALSARTAN 160 MG/1
160 TABLET ORAL DAILY
Qty: 30 TABLET | Refills: 3 | Status: SHIPPED | OUTPATIENT
Start: 2024-06-27

## 2024-06-26 RX ORDER — METRONIDAZOLE 500 MG/1
500 TABLET ORAL 3 TIMES DAILY
Qty: 12 TABLET | Refills: 0 | Status: SHIPPED | OUTPATIENT
Start: 2024-06-26 | End: 2024-06-30

## 2024-06-26 RX ORDER — HYDROCORTISONE 25 MG/G
CREAM TOPICAL 2 TIMES DAILY
Status: DISCONTINUED | OUTPATIENT
Start: 2024-06-26 | End: 2024-06-26 | Stop reason: HOSPADM

## 2024-06-26 RX ORDER — HYDROCORTISONE 25 MG/G
CREAM TOPICAL
Qty: 28 G | Refills: 0 | Status: SHIPPED | OUTPATIENT
Start: 2024-06-26

## 2024-06-26 RX ADMIN — METOPROLOL TARTRATE 50 MG: 50 TABLET, FILM COATED ORAL at 07:57

## 2024-06-26 RX ADMIN — HYDROCORTISONE 2.5%: 25 CREAM TOPICAL at 11:53

## 2024-06-26 RX ADMIN — PANTOPRAZOLE SODIUM 40 MG: 40 INJECTION, POWDER, FOR SOLUTION INTRAVENOUS at 07:58

## 2024-06-26 RX ADMIN — PIPERACILLIN AND TAZOBACTAM 3375 MG: 3; .375 INJECTION, POWDER, LYOPHILIZED, FOR SOLUTION INTRAVENOUS at 00:41

## 2024-06-26 RX ADMIN — PIPERACILLIN AND TAZOBACTAM 3375 MG: 3; .375 INJECTION, POWDER, LYOPHILIZED, FOR SOLUTION INTRAVENOUS at 08:00

## 2024-06-26 RX ADMIN — AMLODIPINE BESYLATE 2.5 MG: 5 TABLET ORAL at 07:57

## 2024-06-26 RX ADMIN — VALSARTAN 160 MG: 160 TABLET, FILM COATED ORAL at 11:53

## 2024-06-26 ASSESSMENT — ENCOUNTER SYMPTOMS
RECTAL PAIN: 0
BACK PAIN: 0
EYE DISCHARGE: 0
DIARRHEA: 0
FACIAL SWELLING: 0
APNEA: 0
ANAL BLEEDING: 1
BLOOD IN STOOL: 0
ABDOMINAL DISTENTION: 0
VOMITING: 0
CHEST TIGHTNESS: 0
ABDOMINAL PAIN: 0
COLOR CHANGE: 0
NAUSEA: 0
CONSTIPATION: 0
EYE ITCHING: 0

## 2024-06-26 NOTE — PLAN OF CARE
Problem: Discharge Planning  Goal: Discharge to home or other facility with appropriate resources  6/26/2024 1350 by Reyes, Abigail, RN  Outcome: Adequate for Discharge  6/26/2024 0910 by Reyes, Abigail, RN  Outcome: Progressing     Problem: Safety - Adult  Goal: Free from fall injury  6/26/2024 1350 by Reyes, Abigail, RN  Outcome: Adequate for Discharge  6/26/2024 0910 by Reyes, Abigail, RN  Outcome: Progressing     Problem: Hematologic - Adult  Goal: Maintains hematologic stability  6/26/2024 1350 by Reyes, Abigail, RN  Outcome: Adequate for Discharge  6/26/2024 0910 by Reyes, Abigail, RN  Outcome: Progressing

## 2024-06-26 NOTE — DISCHARGE SUMMARY
[facility]  Office: [phone]  Curt Venegas DO, Alberto Frye DO, William Fiore DO, Lucho Panda DO, Emily Carbone MD, Briana Velásquez MD, Mejia Trevino MD, Raissa Randolph MD,  Dilan Villasenor MD, Oxana Chau MD, Bertrand Millan MD,  Hank Beauchamp DO, Herminia Garzon MD, Ulisses Hicks MD, Shree Venegas DO, Chichi Lynch MD,  Jaden Bay DO, Isamar Mendoza MD, Erica Delgado MD, Farrah Johnson MD, Jim Randle MD,  Tanner Ivey MD, Eusebio Esparza MD, Geoffrey Jung MD, Amish Lundy MD, Lavell Ferreira MD, Hal Medrano MD, Rocky Stuart DO, César Grady DO, Saman Deng MD,  Jewel Edmond MD, Shirley Waterhouse, CNP,  Becka Brown CNP, Olaf Ragsdale, CNP,  Chikis Perry, RED, Ximena Phelps CNP, Katey Woo, CNP, Tamiko Noel CNP, Trish Goodamn CNP, Theresa Kraus, PA-C, Anamika Baker PA-C, Mikki Joshua, CNP, Kate Solano, CNP, Vikram Davis CNP, Katalina Henderson CNP, Hailey Agarwal CNP, Sherita Amaya, CNS, Celestina Amaya, CNP, Iram Smith CNP, Tracy Schwab, CNP         [facility]   IN-PATIENT SERVICE   [facility]    Discharge Summary     Patient ID: Barbie Arevalo  :  1940   MRN: [id number]     ACCOUNT:  [id number]   Patient's PCP: Mark Bautista MD  Admit Date: 2024   Discharge Date: 2024     Length of Stay: 0  Code Status:  Full Code  Admitting Physician: No admitting provider for patient encounter.  Discharge Physician: Bertrand Millan MD     Active Discharge Diagnoses:     Hospital Problem Lists:  Principal Problem:    Blood per rectum  Active Problems:    Hypertension    Type 2 diabetes mellitus (HCC)    Gastrointestinal hemorrhage    Anxiety    Arthritis    Irritable bowel syndrome with diarrhea    Mixed hyperlipidemia    Supraventricular premature beats    Type 2 diabetes mellitus with hyperglycemia (HCC)  Resolved Problems:    * No resolved hospital problems. *      Admission

## 2024-06-26 NOTE — PLAN OF CARE
Problem: Discharge Planning  Goal: Discharge to home or other facility with appropriate resources  6/25/2024 2205 by Maribell Gaspar RN  Outcome: Progressing  Flowsheets  Taken 6/25/2024 2205  Discharge to home or other facility with appropriate resources: Identify barriers to discharge with patient and caregiver  Taken 6/25/2024 2100  Discharge to home or other facility with appropriate resources: Identify barriers to discharge with patient and caregiver  6/25/2024 1006 by Reyes, Abigail, RN  Outcome: Progressing     Problem: Safety - Adult  Goal: Free from fall injury  6/25/2024 2205 by Maribell Gaspar RN  Outcome: Progressing  Flowsheets (Taken 6/25/2024 2104)  Free From Fall Injury: Instruct family/caregiver on patient safety  6/25/2024 1006 by Reyes, Abigail, RN  Outcome: Progressing     Problem: Hematologic - Adult  Goal: Maintains hematologic stability  6/25/2024 2205 by Maribell Gaspar RN  Outcome: Progressing  Flowsheets  Taken 6/25/2024 2205  Maintains hematologic stability: Assess for signs and symptoms of bleeding or hemorrhage  Taken 6/25/2024 2100  Maintains hematologic stability: Assess for signs and symptoms of bleeding or hemorrhage  6/25/2024 1006 by Reyes, Abigail, RN  Outcome: Progressing

## 2024-06-26 NOTE — PROGRESS NOTES
[facility]  Office: [phone]  Curt Venegas DO, Alberto Frye, DO, William Fiore DO, Lucho Panda, DO, Emily Carbone MD, Briana Velásquez MD, Mejia Trevino MD, Raissa Randolph MD,  Dilan Villasenor MD, Oxana Chau MD, Bertrand Millan MD,  Hank Beauchamp DO, Herminia Garzon MD, Ulisses Hicks MD, Shree Venegas DO, Chichi Lynch MD,  Jaden Bay DO, Isamar Mendoza MD, Erica Delgado MD, Farrah Johnson MD, Jim Randle MD,  Tanner Ivey MD, Eusebio Esparza MD, Geoffrey Jung MD, Amish Lundy MD, Lavell Ferreira MD, Hal Medrano MD, Rocky Stuart DO, César Grady DO, Saman Deng MD,  Jewel Edmond MD, Shirley Waterhouse, CNP,  Becka Brown CNP, Olaf Ragsdale, CNP,  Chikis Perry, DNP, Ximena Phelps, CNP, Katey Woo, CNP, Tamiko Noel, CNP, Trish Goodman, CNP, Theresa Kraus, PA-C, Aanmika Baker PA-C, Mikki Joshua, CNP, Kate Solano, CNP, Vikram Davis, CNP, Katalina Henderson, CNP, Hailey Agarwal, CNP, Sherita Amaya, CNS, Celestina Amaya, CNP, Iram Smith CNP, Tracy Schwab, CNP         [facility]   IN-PATIENT SERVICE   [facility]    Progress Note    6/26/2024    9:33 AM    Name:   Barbie Arevalo  MRN:     [id number]     Acct:      [id number]   Room:   [facility] Day:  0  Admit Date:  6/24/2024  6:33 PM    PCP:   Mark Bautista MD  Code Status:  Full Code    Subjective:     C/C:   Chief Complaint   Patient presents with    Fatigue     Onset about 5 days ago.  Pt complains of feeling generalized weakness.  Also, pt describes rectal bleeding x1 event today with bowel movement.      Interval History Status: improved.     Patient seen and examined. Feeling better. Had 2 bowel movements, had blood when wiping the first time, no blood and brown bowel movements yesterday.    Brief History:   63-year-old female past medical history of anxiety, arthritis, GERD, IBS with diarrhea, hyperlipidemia, hypertension, history of SVT, diabetes

## 2024-06-26 NOTE — PROGRESS NOTES
GI Progress notes    6/26/2024   9:56 AM    Name:  Barbie Arevalo  MRN:    [id number]     Acct:     [id number]   Room:  [address] [place] [address] Day: 0     Admit Date: 6/24/2024  6:33 PM  PCP: Mark Bautista MD    Subjective:     C/C:   Chief Complaint   Patient presents with    Fatigue     Onset about 5 days ago.  Pt complains of feeling generalized weakness.  Also, pt describes rectal bleeding x1 event today with bowel movement.        Interval History: Status: improved.     Patient seen and examined  No acute events overnight  Tolerated CLD  Hgb stable  No further bleeding  Afebrile, vss      ROS:  Constitutional: negative for chills, fevers and sweats  Gastrointestinal: negative for abdominal pain, constipation, diarrhea, nausea and vomiting  Neurological: negative for dizziness and headaches    Medications:     Allergies: No Known Allergies    Current Meds: hydrocortisone (ANUSOL-HC) 2.5 % rectal cream, BID  valsartan (DIOVAN) tablet 160 mg, Daily  sodium chloride flush 0.9 % injection 5-40 mL, 2 times per day  sodium chloride flush 0.9 % injection 5-40 mL, PRN  0.9 % sodium chloride infusion, PRN  ondansetron (ZOFRAN-ODT) disintegrating tablet 4 mg, Q8H PRN   Or  ondansetron (ZOFRAN) injection 4 mg, Q6H PRN  acetaminophen (TYLENOL) tablet 650 mg, Q6H PRN   Or  acetaminophen (TYLENOL) suppository 650 mg, Q6H PRN  glucose chewable tablet 16 g, PRN  dextrose bolus 10% 125 mL, PRN   Or  dextrose bolus 10% 250 mL, PRN  glucagon injection 1 mg, PRN  dextrose 10 % infusion, Continuous PRN  insulin lispro (HUMALOG,ADMELOG) injection vial 0-4 Units, Q4H  hydrALAZINE (APRESOLINE) injection 10 mg, Q6H PRN  pantoprazole (PROTONIX) 40 mg in sodium chloride (PF) 0.9 % 10 mL injection, Daily  piperacillin-tazobactam (ZOSYN) 3,375 mg in sodium chloride 0.9 % 50 mL IVPB (mini-bag), Q8H  amLODIPine (NORVASC) tablet 2.5 mg, Daily  metoprolol tartrate (LOPRESSOR) tablet 50 mg, BID  sodium chloride flush 0.9 % injection 10 mL,

## 2024-06-26 NOTE — CARE COORDINATION
[facility] Quality Flow/Interdisciplinary Rounds Progress Note    Quality Flow Rounds held on June 26, 2024 at 0930    Disciplines Attending:  Bedside Nurse, , , and Nursing Unit Leadership    Barriers to Discharge: Clinical status    Anticipated Discharge Date:   6/26/24    Anticipated Discharge Disposition: Home    Readmission Risk              Risk of Unplanned Readmission:  0           Discussed patient goal for the day, patient clinical progression, and barriers to discharge.  Diet advanced. Plan for possible discharge home later today.  Jania Contreras RN  June 26, 2024

## 2024-06-26 NOTE — PLAN OF CARE
Problem: Discharge Planning  Goal: Discharge to home or other facility with appropriate resources  6/26/2024 0910 by Reyes, Abigail, RN  Outcome: Progressing  6/25/2024 2205 by Maribell Gaspar RN  Outcome: Progressing  Flowsheets  Taken 6/25/2024 2205  Discharge to home or other facility with appropriate resources: Identify barriers to discharge with patient and caregiver  Taken 6/25/2024 2100  Discharge to home or other facility with appropriate resources: Identify barriers to discharge with patient and caregiver     Problem: Safety - Adult  Goal: Free from fall injury  6/26/2024 0910 by Reyes, Abigail, RN  Outcome: Progressing  6/25/2024 2205 by Maribell Gaspar RN  Outcome: Progressing  Flowsheets (Taken 6/25/2024 2104)  Free From Fall Injury: Instruct family/caregiver on patient safety     Problem: Hematologic - Adult  Goal: Maintains hematologic stability  6/26/2024 0910 by Reyes, Abigail, RN  Outcome: Progressing  6/25/2024 2205 by Maribell Gaspar RN  Outcome: Progressing  Flowsheets  Taken 6/25/2024 2205  Maintains hematologic stability: Assess for signs and symptoms of bleeding or hemorrhage  Taken 6/25/2024 2100  Maintains hematologic stability: Assess for signs and symptoms of bleeding or hemorrhage

## 2024-07-05 ENCOUNTER — TELEPHONE (OUTPATIENT)
Dept: GASTROENTEROLOGY | Age: 84
End: 2024-07-05

## 2024-07-05 NOTE — TELEPHONE ENCOUNTER
Patient was seen in the hospital with Cici Purvis CNP on 6/24/24.  Patient is to follow up with our office and establish care with someone.  Patient states she would like to be seen ASAP due to having diarrhea and bowel incontinence .    Sending to  for scheduling.    Thank you    Sharri

## 2024-07-13 ENCOUNTER — HOSPITAL ENCOUNTER (OUTPATIENT)
Age: 84
Setting detail: OBSERVATION
Discharge: HOME | End: 2024-07-14
Attending: STUDENT IN AN ORGANIZED HEALTH CARE EDUCATION/TRAINING PROGRAM | Admitting: STUDENT IN AN ORGANIZED HEALTH CARE EDUCATION/TRAINING PROGRAM
Payer: MEDICARE

## 2024-07-13 DIAGNOSIS — D72.829 LEUKOCYTOSIS, UNSPECIFIED TYPE: ICD-10-CM

## 2024-07-13 DIAGNOSIS — R42 DIZZINESS: Primary | ICD-10-CM

## 2024-07-13 DIAGNOSIS — R10.84 GENERALIZED ABDOMINAL PAIN: ICD-10-CM

## 2024-07-13 DIAGNOSIS — K52.9 ENTERITIS: ICD-10-CM

## 2024-07-13 DIAGNOSIS — R55 PRE-SYNCOPE: ICD-10-CM

## 2024-07-13 PROCEDURE — 99285 EMERGENCY DEPT VISIT HI MDM: CPT

## 2024-07-14 ENCOUNTER — APPOINTMENT (OUTPATIENT)
Dept: CT IMAGING | Age: 84
End: 2024-07-14
Payer: MEDICARE

## 2024-07-14 ENCOUNTER — APPOINTMENT (OUTPATIENT)
Dept: GENERAL RADIOLOGY | Age: 84
End: 2024-07-14
Payer: MEDICARE

## 2024-07-14 VITALS
HEART RATE: 63 BPM | BODY MASS INDEX: 28.3 KG/M2 | DIASTOLIC BLOOD PRESSURE: 64 MMHG | RESPIRATION RATE: 18 BRPM | WEIGHT: 149.91 LBS | SYSTOLIC BLOOD PRESSURE: 154 MMHG | TEMPERATURE: 97.5 F | OXYGEN SATURATION: 95 % | HEIGHT: 61 IN

## 2024-07-14 PROBLEM — R55 PRE-SYNCOPE: Status: ACTIVE | Noted: 2024-07-14

## 2024-07-14 LAB
ALBUMIN SERPL-MCNC: 4.3 G/DL (ref 3.5–5.2)
ALBUMIN/GLOB SERPL: 1.5 {RATIO} (ref 1–2.5)
ALP SERPL-CCNC: 66 U/L (ref 35–104)
ALT SERPL-CCNC: 13 U/L (ref 5–33)
ANION GAP SERPL CALCULATED.3IONS-SCNC: 11 MMOL/L (ref 9–17)
ANION GAP SERPL CALCULATED.3IONS-SCNC: 13 MMOL/L (ref 9–17)
AST SERPL-CCNC: 14 U/L
BASOPHILS # BLD: 0.02 K/UL (ref 0–0.2)
BASOPHILS NFR BLD: 0 % (ref 0–2)
BILIRUB SERPL-MCNC: 0.6 MG/DL (ref 0.3–1.2)
BUN SERPL-MCNC: 15 MG/DL (ref 8–23)
BUN SERPL-MCNC: 17 MG/DL (ref 8–23)
CALCIUM SERPL-MCNC: 8.9 MG/DL (ref 8.6–10.4)
CALCIUM SERPL-MCNC: 9.4 MG/DL (ref 8.6–10.4)
CHLORIDE SERPL-SCNC: 103 MMOL/L (ref 98–107)
CHLORIDE SERPL-SCNC: 99 MMOL/L (ref 98–107)
CO2 SERPL-SCNC: 24 MMOL/L (ref 20–31)
CO2 SERPL-SCNC: 27 MMOL/L (ref 20–31)
CREAT SERPL-MCNC: 0.7 MG/DL (ref 0.5–0.9)
CREAT SERPL-MCNC: 0.8 MG/DL (ref 0.5–0.9)
EKG ATRIAL RATE: 65 BPM
EKG P AXIS: 21 DEGREES
EKG P-R INTERVAL: 188 MS
EKG Q-T INTERVAL: 436 MS
EKG QRS DURATION: 86 MS
EKG QTC CALCULATION (BAZETT): 453 MS
EKG R AXIS: -16 DEGREES
EKG T AXIS: 61 DEGREES
EKG VENTRICULAR RATE: 65 BPM
EOSINOPHIL # BLD: 0.05 K/UL (ref 0–0.4)
EOSINOPHILS RELATIVE PERCENT: 0 % (ref 1–4)
ERYTHROCYTE [DISTWIDTH] IN BLOOD BY AUTOMATED COUNT: 15 % (ref 12.5–15.4)
ERYTHROCYTE [DISTWIDTH] IN BLOOD BY AUTOMATED COUNT: 15.3 % (ref 12.5–15.4)
GFR, ESTIMATED: 73 ML/MIN/1.73M2
GFR, ESTIMATED: 85 ML/MIN/1.73M2
GLUCOSE SERPL-MCNC: 130 MG/DL (ref 70–99)
GLUCOSE SERPL-MCNC: 220 MG/DL (ref 70–99)
HCT VFR BLD AUTO: 38.9 % (ref 36–46)
HCT VFR BLD AUTO: 42.7 % (ref 36–46)
HGB BLD-MCNC: 13.3 G/DL (ref 12–16)
HGB BLD-MCNC: 14.2 G/DL (ref 12–16)
INR PPP: 1
LIPASE SERPL-CCNC: 13 U/L (ref 13–60)
LYMPHOCYTES NFR BLD: 0.83 K/UL (ref 1–4.8)
LYMPHOCYTES RELATIVE PERCENT: 7 % (ref 24–44)
MCH RBC QN AUTO: 27.5 PG (ref 26–34)
MCH RBC QN AUTO: 27.6 PG (ref 26–34)
MCHC RBC AUTO-ENTMCNC: 33.3 G/DL (ref 31–37)
MCHC RBC AUTO-ENTMCNC: 34.2 G/DL (ref 31–37)
MCV RBC AUTO: 80.8 FL (ref 80–100)
MCV RBC AUTO: 82.6 FL (ref 80–100)
MONOCYTES NFR BLD: 0.85 K/UL (ref 0.1–1.2)
MONOCYTES NFR BLD: 7 % (ref 2–11)
NEUTROPHILS NFR BLD: 86 % (ref 36–66)
NEUTS SEG NFR BLD: 11.09 K/UL (ref 1.8–7.7)
PLATELET # BLD AUTO: 206 K/UL (ref 140–450)
PLATELET # BLD AUTO: 210 K/UL (ref 140–450)
PMV BLD AUTO: 7.9 FL (ref 6–12)
PMV BLD AUTO: 9.7 FL (ref 8–14)
POTASSIUM SERPL-SCNC: 4.3 MMOL/L (ref 3.7–5.3)
POTASSIUM SERPL-SCNC: 4.6 MMOL/L (ref 3.7–5.3)
PROT SERPL-MCNC: 7.2 G/DL (ref 6.4–8.3)
PROTHROMBIN TIME: 10.4 SEC (ref 9.4–12.6)
RBC # BLD AUTO: 4.82 M/UL (ref 4–5.2)
RBC # BLD AUTO: 5.17 M/UL (ref 4–5.2)
SODIUM SERPL-SCNC: 137 MMOL/L (ref 135–144)
SODIUM SERPL-SCNC: 140 MMOL/L (ref 135–144)
TROPONIN I SERPL HS-MCNC: 14 NG/L (ref 0–14)
WBC OTHER # BLD: 12.8 K/UL (ref 3.5–11)
WBC OTHER # BLD: 8.6 K/UL (ref 3.5–11)

## 2024-07-14 PROCEDURE — 36415 COLL VENOUS BLD VENIPUNCTURE: CPT

## 2024-07-14 PROCEDURE — 74177 CT ABD & PELVIS W/CONTRAST: CPT

## 2024-07-14 PROCEDURE — 93005 ELECTROCARDIOGRAM TRACING: CPT | Performed by: PHYSICIAN ASSISTANT

## 2024-07-14 PROCEDURE — G0378 HOSPITAL OBSERVATION PER HR: HCPCS

## 2024-07-14 PROCEDURE — 80048 BASIC METABOLIC PNL TOTAL CA: CPT

## 2024-07-14 PROCEDURE — 6360000004 HC RX CONTRAST MEDICATION: Performed by: STUDENT IN AN ORGANIZED HEALTH CARE EDUCATION/TRAINING PROGRAM

## 2024-07-14 PROCEDURE — 80053 COMPREHEN METABOLIC PANEL: CPT

## 2024-07-14 PROCEDURE — 85027 COMPLETE CBC AUTOMATED: CPT

## 2024-07-14 PROCEDURE — 99222 1ST HOSP IP/OBS MODERATE 55: CPT | Performed by: STUDENT IN AN ORGANIZED HEALTH CARE EDUCATION/TRAINING PROGRAM

## 2024-07-14 PROCEDURE — 85025 COMPLETE CBC W/AUTO DIFF WBC: CPT

## 2024-07-14 PROCEDURE — 6360000002 HC RX W HCPCS: Performed by: NURSE PRACTITIONER

## 2024-07-14 PROCEDURE — 2580000003 HC RX 258: Performed by: STUDENT IN AN ORGANIZED HEALTH CARE EDUCATION/TRAINING PROGRAM

## 2024-07-14 PROCEDURE — 2580000003 HC RX 258: Performed by: NURSE PRACTITIONER

## 2024-07-14 PROCEDURE — 83690 ASSAY OF LIPASE: CPT

## 2024-07-14 PROCEDURE — 71045 X-RAY EXAM CHEST 1 VIEW: CPT

## 2024-07-14 PROCEDURE — 6370000000 HC RX 637 (ALT 250 FOR IP): Performed by: STUDENT IN AN ORGANIZED HEALTH CARE EDUCATION/TRAINING PROGRAM

## 2024-07-14 PROCEDURE — 80061 LIPID PANEL: CPT

## 2024-07-14 PROCEDURE — 85610 PROTHROMBIN TIME: CPT

## 2024-07-14 PROCEDURE — 84484 ASSAY OF TROPONIN QUANT: CPT

## 2024-07-14 RX ORDER — ENOXAPARIN SODIUM 100 MG/ML
40 INJECTION SUBCUTANEOUS DAILY
Status: DISCONTINUED | OUTPATIENT
Start: 2024-07-14 | End: 2024-07-14 | Stop reason: HOSPADM

## 2024-07-14 RX ORDER — SODIUM CHLORIDE 0.9 % (FLUSH) 0.9 %
10 SYRINGE (ML) INJECTION PRN
Status: DISCONTINUED | OUTPATIENT
Start: 2024-07-14 | End: 2024-07-14 | Stop reason: HOSPADM

## 2024-07-14 RX ORDER — SODIUM CHLORIDE 0.9 % (FLUSH) 0.9 %
5-40 SYRINGE (ML) INJECTION EVERY 12 HOURS SCHEDULED
Status: DISCONTINUED | OUTPATIENT
Start: 2024-07-14 | End: 2024-07-14 | Stop reason: HOSPADM

## 2024-07-14 RX ORDER — MAGNESIUM SULFATE 1 G/100ML
1000 INJECTION INTRAVENOUS PRN
Status: DISCONTINUED | OUTPATIENT
Start: 2024-07-14 | End: 2024-07-14 | Stop reason: HOSPADM

## 2024-07-14 RX ORDER — DICYCLOMINE HYDROCHLORIDE 10 MG/1
10 CAPSULE ORAL 3 TIMES DAILY PRN
Qty: 6 CAPSULE | Refills: 0 | Status: SHIPPED | OUTPATIENT
Start: 2024-07-14

## 2024-07-14 RX ORDER — ONDANSETRON 2 MG/ML
4 INJECTION INTRAMUSCULAR; INTRAVENOUS EVERY 6 HOURS PRN
Status: DISCONTINUED | OUTPATIENT
Start: 2024-07-14 | End: 2024-07-14 | Stop reason: HOSPADM

## 2024-07-14 RX ORDER — LOPERAMIDE HYDROCHLORIDE 2 MG/1
2 CAPSULE ORAL PRN
Status: DISCONTINUED | OUTPATIENT
Start: 2024-07-14 | End: 2024-07-14 | Stop reason: HOSPADM

## 2024-07-14 RX ORDER — ASPIRIN 81 MG/1
81 TABLET, CHEWABLE ORAL DAILY
Status: DISCONTINUED | OUTPATIENT
Start: 2024-07-14 | End: 2024-07-14 | Stop reason: HOSPADM

## 2024-07-14 RX ORDER — ACETAMINOPHEN 650 MG/1
650 SUPPOSITORY RECTAL EVERY 6 HOURS PRN
Status: DISCONTINUED | OUTPATIENT
Start: 2024-07-14 | End: 2024-07-14 | Stop reason: HOSPADM

## 2024-07-14 RX ORDER — 0.9 % SODIUM CHLORIDE 0.9 %
80 INTRAVENOUS SOLUTION INTRAVENOUS ONCE
Status: DISCONTINUED | OUTPATIENT
Start: 2024-07-14 | End: 2024-07-14 | Stop reason: HOSPADM

## 2024-07-14 RX ORDER — ONDANSETRON 4 MG/1
4 TABLET, FILM COATED ORAL 3 TIMES DAILY PRN
Qty: 10 TABLET | Refills: 0 | Status: SHIPPED | OUTPATIENT
Start: 2024-07-14

## 2024-07-14 RX ORDER — ACETAMINOPHEN 325 MG/1
650 TABLET ORAL EVERY 6 HOURS PRN
Status: DISCONTINUED | OUTPATIENT
Start: 2024-07-14 | End: 2024-07-14 | Stop reason: HOSPADM

## 2024-07-14 RX ORDER — POTASSIUM CHLORIDE 20 MEQ/1
40 TABLET, EXTENDED RELEASE ORAL PRN
Status: DISCONTINUED | OUTPATIENT
Start: 2024-07-14 | End: 2024-07-14 | Stop reason: HOSPADM

## 2024-07-14 RX ORDER — POTASSIUM CHLORIDE 7.45 MG/ML
10 INJECTION INTRAVENOUS PRN
Status: DISCONTINUED | OUTPATIENT
Start: 2024-07-14 | End: 2024-07-14 | Stop reason: HOSPADM

## 2024-07-14 RX ORDER — AMLODIPINE BESYLATE 5 MG/1
2.5 TABLET ORAL DAILY
Status: DISCONTINUED | OUTPATIENT
Start: 2024-07-14 | End: 2024-07-14 | Stop reason: HOSPADM

## 2024-07-14 RX ORDER — SODIUM CHLORIDE 9 MG/ML
INJECTION, SOLUTION INTRAVENOUS PRN
Status: DISCONTINUED | OUTPATIENT
Start: 2024-07-14 | End: 2024-07-14 | Stop reason: HOSPADM

## 2024-07-14 RX ORDER — ONDANSETRON 4 MG/1
4 TABLET, ORALLY DISINTEGRATING ORAL EVERY 8 HOURS PRN
Status: DISCONTINUED | OUTPATIENT
Start: 2024-07-14 | End: 2024-07-14 | Stop reason: HOSPADM

## 2024-07-14 RX ORDER — POLYETHYLENE GLYCOL 3350 17 G/17G
17 POWDER, FOR SOLUTION ORAL DAILY PRN
Status: DISCONTINUED | OUTPATIENT
Start: 2024-07-14 | End: 2024-07-14 | Stop reason: HOSPADM

## 2024-07-14 RX ORDER — METOPROLOL TARTRATE 50 MG/1
50 TABLET, FILM COATED ORAL 2 TIMES DAILY
Status: DISCONTINUED | OUTPATIENT
Start: 2024-07-14 | End: 2024-07-14 | Stop reason: HOSPADM

## 2024-07-14 RX ADMIN — IOPAMIDOL 75 ML: 755 INJECTION, SOLUTION INTRAVENOUS at 00:16

## 2024-07-14 RX ADMIN — Medication 80 ML: at 00:16

## 2024-07-14 RX ADMIN — METOPROLOL TARTRATE 50 MG: 50 TABLET, FILM COATED ORAL at 08:38

## 2024-07-14 RX ADMIN — SODIUM CHLORIDE, PRESERVATIVE FREE 10 ML: 5 INJECTION INTRAVENOUS at 07:26

## 2024-07-14 RX ADMIN — ASPIRIN 81 MG: 81 TABLET, CHEWABLE ORAL at 08:38

## 2024-07-14 RX ADMIN — SODIUM CHLORIDE, PRESERVATIVE FREE 10 ML: 5 INJECTION INTRAVENOUS at 00:16

## 2024-07-14 RX ADMIN — AMLODIPINE BESYLATE 2.5 MG: 5 TABLET ORAL at 08:38

## 2024-07-14 NOTE — ED PROVIDER NOTES
[address]  Emergency Department  Emergency Medicine Attending Physician  [facility]     Patient Name: Barbie Arevalo  MRN: [id number]  Birthdate 1940  Date of evaluation: 7/14/24           I was personally available for consultation in the Emergency Department. Have reviewed everything on the chart that is available and agree with the documentation provided by the advanced practice provider, including discussion about the assessment, treatment plan and disposition.    Barbie Arevalo is a 83 y.o. female who presents with Abdominal Pain (cramping) and Dizziness      HPI: This is an 83-year-old female that presents with concerns for ongoing abdominal pain, cramping, episodes of dizziness.  Patient reports that abdominal pain has been coming and going this evening had episode of abdominal pain that resulted in patient calling out to EMS.  At the time patient reported dizziness.  After further discussion with patient she has reported intermittent dizziness.  No chest pain.  Reports that dizziness has been coming and going intermittently for quite some time.  She did discuss this with her PCP at hospital follow-up as she was recently admitted for diverticulitis.  She has finished course of antibiotics about 4 days ago and was doing well up until today.        PAST MEDICAL / SURGICAL / SOCIAL / FAMILY HISTORY      has a past medical history of Diabetes 1.5, managed as type 2 (HCC) and Hypertension.       has no past surgical history on file.      Social History     Socioeconomic History    Marital status: Unknown     Spouse name: Not on file    Number of children: Not on file    Years of education: Not on file    Highest education level: Not on file   Occupational History    Not on file   Tobacco Use    Smoking status: Never    Smokeless tobacco: Never   Vaping Use    Vaping Use: Never used   Substance and Sexual Activity    Alcohol use: Not on file    Drug use: Never    Sexual activity:

## 2024-07-14 NOTE — PROGRESS NOTES
Pt discharged via wheelchair with son. Iv removed and discharge reviewed with patient and son. My chart information sent to patient cell phone to sign up.

## 2024-07-14 NOTE — PLAN OF CARE
Problem: Discharge Planning  Goal: Discharge to home or other facility with appropriate resources  7/14/2024 1325 by Sherry Portillo, RN  Outcome: Adequate for Discharge  Flowsheets (Taken 7/14/2024 0725)  Discharge to home or other facility with appropriate resources: Identify barriers to discharge with patient and caregiver  7/14/2024 0647 by Kaylen Amaya, RN  Outcome: Progressing  Flowsheets (Taken 7/14/2024 0647)  Discharge to home or other facility with appropriate resources:   Identify barriers to discharge with patient and caregiver   Arrange for needed discharge resources and transportation as appropriate   Identify discharge learning needs (meds, wound care, etc)     Problem: Safety - Adult  Goal: Free from fall injury  Outcome: Adequate for Discharge     Problem: Chronic Conditions and Co-morbidities  Goal: Patient's chronic conditions and co-morbidity symptoms are monitored and maintained or improved  Outcome: Adequate for Discharge  Flowsheets (Taken 7/14/2024 0725)  Care Plan - Patient's Chronic Conditions and Co-Morbidity Symptoms are Monitored and Maintained or Improved: Monitor and assess patient's chronic conditions and comorbid symptoms for stability, deterioration, or improvement

## 2024-07-14 NOTE — ED PROVIDER NOTES
EMERGENCY DEPARTMENT ENCOUNTER      Pt Name: Barbie Arevalo  MRN: [id number]  Birthdate 1940  Date of evaluation: 7/13/2024  Provider: Tr Segovia PA-C    CHIEF COMPLAINT       Chief Complaint   Patient presents with    Abdominal Pain     cramping    Dizziness         HISTORY OF PRESENT ILLNESS      Barbie Arevalo is a 83 y.o. female who presents to the emergency department via EMS complaining of abdominal cramping that is been going on all day today.  She also states that she became concerned because she had a near syncopal episode and this is why she contacted the EMS.  She states that she did not have syncope, but she did become diaphoretic and states that the room got darker.  She denies any chest pain shortness of breath abdominal pain nausea vomiting fevers or chills at this time.  She states that she feels fine.  Denies any previous cardiac problems.  Denies any dizziness currently.  She has finished all her outpatient treatment after being admitted for abdominal pain and bloody stools.        REVIEW OF SYSTEMS       Review of Systems   AS STATED IN HPI      PAST MEDICAL HISTORY     Past Medical History:   Diagnosis Date    Diabetes 1.5, managed as type 2 (HCC)     Hypertension          SURGICAL HISTORY       History reviewed. No pertinent surgical history.      CURRENT MEDICATIONS       Discharge Medication List as of 7/14/2024  1:22 PM        CONTINUE these medications which have NOT CHANGED    Details   valsartan (DIOVAN) 160 MG tablet Take 1 tablet by mouth daily, Disp-30 tablet, R-3Normal      metoprolol tartrate (LOPRESSOR) 50 MG tablet Take 1 tablet by mouth 2 times dailyHistorical Med      amLODIPine (NORVASC) 2.5 MG tablet Take 1 tablet by mouth dailyHistorical Med      glimepiride (AMARYL) 2 MG tablet Take 1 tablet by mouth 2 times dailyHistorical Med      aspirin 81 MG chewable tablet Take 1 tablet by mouth dailyHistorical Med      loperamide (IMODIUM) 2 MG capsule Take 1 capsule by

## 2024-07-14 NOTE — PLAN OF CARE
Problem: Discharge Planning  Goal: Discharge to home or other facility with appropriate resources  Outcome: Progressing  Flowsheets (Taken 7/14/2024 3963)  Discharge to home or other facility with appropriate resources:   Identify barriers to discharge with patient and caregiver   Arrange for needed discharge resources and transportation as appropriate   Identify discharge learning needs (meds, wound care, etc)

## 2024-07-14 NOTE — DISCHARGE SUMMARY
[facility]  Office: [phone]  Curt Venegas DO, Alberto Frye DO, William Fiore DO, Lucho Panda DO, Emily Carbone MD, Briana Velásquez MD, Mejia Trevino MD, Raissa Randolph MD,  Dilan Villasenor MD, Oxana Chau MD, Bertrand Millan MD,  Hank Beauchamp DO, Hermiina Garzon MD, Ulisses Hicks MD, Shree Venegas DO, Chichi Lynch MD,  Jaden Bay DO, Isamar Mendoza MD, Erica Delgado MD, Farrah Johnson MD, Jim Randle MD,  Tanner Ivey MD, Eusebio Esparza MD, Geoffrey Jung MD, Amish Lundy MD, Lavell Ferreira MD, Hal Medrano MD, Rocky Stuart DO, César Grady DO, Saman Deng MD,  Jewel Edmond MD, Shirley Waterhouse, CNP,  Becka Brown CNP, Olaf Ragsdale, CNP,  Chikis Perry, RED, Ximena Phelps, CNP, Katey Woo, CNP, Tamiko Noel CNP, Trish Goodman, CNP, Theresa Kraus, PA-C, Anamika Baker PA-C, Mikki Joshua, CNP, Kate Solano, CNP, Vikram Davis, CNP, Katalina Henderson, CNP, Hailey Agarwal, CNP, Sherita Amaya, CNS, Celestina Amaya, CNP, Iram Smith CNP, Tracy Schwab, CNP         [facility]   IN-PATIENT SERVICE   [facility]    Discharge Summary     Patient ID: Barbie Arevalo  :  1940   MRN: [id number]     ACCOUNT:  [id number]   Patient's PCP: Mark Bautista MD  Admit Date: 2024   Discharge Date: 2024     Length of Stay: 0  Code Status:  Full Code  Admitting Physician: Bertrand Millan MD  Discharge Physician: Bertrand Millan MD     Active Discharge Diagnoses:     Hospital Problem Lists:  Principal Problem:    Pre-syncope  Active Problems:    Hypertension    Gastrointestinal hemorrhage    Anxiety    Arthritis    Irritable bowel syndrome with diarrhea    Mixed hyperlipidemia    Supraventricular premature beats  Resolved Problems:    * No resolved hospital problems. *      Admission Condition:  stable     Discharged Condition: stable    Hospital Stay:     Hospital Course:  Barbie Arevalo is a 83

## 2024-07-15 LAB
CHOLEST SERPL-MCNC: 197 MG/DL (ref 0–199)
CHOLESTEROL/HDL RATIO: 5
HDLC SERPL-MCNC: 40 MG/DL
LDLC SERPL CALC-MCNC: 96 MG/DL (ref 0–100)
TRIGL SERPL-MCNC: 308 MG/DL
VLDLC SERPL CALC-MCNC: 62 MG/DL

## 2024-07-18 ENCOUNTER — OFFICE VISIT (OUTPATIENT)
Dept: SURGERY | Age: 84
End: 2024-07-18
Payer: MEDICARE

## 2024-07-18 VITALS
WEIGHT: 158 LBS | SYSTOLIC BLOOD PRESSURE: 158 MMHG | BODY MASS INDEX: 29.83 KG/M2 | DIASTOLIC BLOOD PRESSURE: 75 MMHG | HEART RATE: 61 BPM | HEIGHT: 61 IN

## 2024-07-18 DIAGNOSIS — Z76.89 ESTABLISHING CARE WITH NEW DOCTOR, ENCOUNTER FOR: ICD-10-CM

## 2024-07-18 DIAGNOSIS — Z87.19 HISTORY OF RECTAL BLEEDING: Primary | ICD-10-CM

## 2024-07-18 DIAGNOSIS — R19.7 DIARRHEA, UNSPECIFIED TYPE: ICD-10-CM

## 2024-07-18 PROCEDURE — 99203 OFFICE O/P NEW LOW 30 MIN: CPT | Performed by: COLON & RECTAL SURGERY

## 2024-07-18 PROCEDURE — 3077F SYST BP >= 140 MM HG: CPT | Performed by: COLON & RECTAL SURGERY

## 2024-07-18 PROCEDURE — 3078F DIAST BP <80 MM HG: CPT | Performed by: COLON & RECTAL SURGERY

## 2024-07-18 PROCEDURE — 1123F ACP DISCUSS/DSCN MKR DOCD: CPT | Performed by: COLON & RECTAL SURGERY

## 2024-07-18 ASSESSMENT — ENCOUNTER SYMPTOMS
ABDOMINAL DISTENTION: 0
CONSTIPATION: 0
BLOOD IN STOOL: 0
SHORTNESS OF BREATH: 0
APNEA: 0
VOMITING: 0
COUGH: 0
NAUSEA: 0
CHEST TIGHTNESS: 0
RECTAL PAIN: 0
COLOR CHANGE: 0
STRIDOR: 0
WHEEZING: 0
DIARRHEA: 1
ABDOMINAL PAIN: 0
ANAL BLEEDING: 1
BACK PAIN: 0

## 2024-07-18 NOTE — PROGRESS NOTES
[facility]  [id number] [id number]  Dept: [phone]    Patient:  Barbie Arevalo  YOB: 1940  Date: 7/18/2024     The patient is a 83 y.o. female who presents today for consult of the following problems:     Chief Complaint: New Patient (Ms. Arevalo presents to clinic with rectal bleeding. Went to the hospital and was dx with diverticulosis. She had some abdominal pain after her hospital visit.. )       HPI:   This 83-year-old  lady, known to me for several years from my previous practice, presents to me with concerns for recent rectal bleeding and ongoing loose motions for several years.  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  Patient was noted to have a lesion at the anastomotic site showing high-grade dysplasia in 2021.  2 attempts were made to remove this by EMR by Dr. Baldwin.  This was followed by [facility].  Subsequently since it still could not be completely removed or destroyed she was referred to Dr. Fong at [facility] in late 2022 for possible revision of anastomosis.  Patient states that a colonoscopy procedure was done at [facility] but the exact report is not available at this time.    Patient had a significant bout of rectal bleeding for which she attended the emergency department.   CT scan at ER evaluation showed possibility of enteritis and diverticulosis without diverticulitis.    Currently patient is asymptomatic except for loose bowel motions which has been a longstanding symptom ever since her low anterior resection.  History:     Past Medical History:   Diagnosis Date    Diabetes 1.5, managed as type 2 (HCC)     Hypertension      No past surgical history on file.  No family history on file.  Social

## 2024-07-23 ENCOUNTER — TELEPHONE (OUTPATIENT)
Dept: GASTROENTEROLOGY | Age: 84
End: 2024-07-23

## 2024-11-05 ENCOUNTER — OFFICE VISIT (OUTPATIENT)
Age: 84
End: 2024-11-05

## 2024-11-05 VITALS — BODY MASS INDEX: 29.83 KG/M2 | HEIGHT: 61 IN | WEIGHT: 158 LBS

## 2024-11-05 DIAGNOSIS — Z96.659 STATUS POST TOTAL KNEE REPLACEMENT, UNSPECIFIED LATERALITY: ICD-10-CM

## 2024-11-05 DIAGNOSIS — M25.561 PAIN IN BOTH KNEES, UNSPECIFIED CHRONICITY: Primary | ICD-10-CM

## 2024-11-05 DIAGNOSIS — M51.361 DEGENERATION OF INTERVERTEBRAL DISC OF LUMBAR REGION WITH LOWER EXTREMITY PAIN: ICD-10-CM

## 2024-11-05 DIAGNOSIS — M17.11 PRIMARY OSTEOARTHRITIS OF RIGHT KNEE: ICD-10-CM

## 2024-11-05 DIAGNOSIS — M25.562 PAIN IN BOTH KNEES, UNSPECIFIED CHRONICITY: Primary | ICD-10-CM

## 2024-11-05 RX ORDER — HYDROCHLOROTHIAZIDE 12.5 MG/1
TABLET ORAL
COMMUNITY
Start: 2024-10-30

## 2024-11-05 RX ORDER — VALSARTAN 320 MG/1
TABLET ORAL
COMMUNITY
Start: 2024-08-19

## 2024-11-05 RX ORDER — EZETIMIBE 10 MG/1
TABLET ORAL
COMMUNITY
Start: 2024-10-28

## 2024-11-05 RX ADMIN — METHYLPREDNISOLONE ACETATE 80 MG: 80 INJECTION, SUSPENSION INTRA-ARTICULAR; INTRALESIONAL; INTRAMUSCULAR; SOFT TISSUE at 08:21

## 2024-11-05 RX ADMIN — LIDOCAINE HYDROCHLORIDE 2 ML: 10 INJECTION, SOLUTION INFILTRATION; PERINEURAL at 08:21

## 2024-11-05 NOTE — PROGRESS NOTES
Please note that this chart was generated using voice recognition Dragon dictation software.  Although every effort was made to ensure the accuracy of this automated transcription, some errors in transcription may have occurred.

## 2024-11-06 RX ORDER — LIDOCAINE HYDROCHLORIDE 10 MG/ML
2 INJECTION, SOLUTION INFILTRATION; PERINEURAL ONCE
Status: COMPLETED | OUTPATIENT
Start: 2024-11-06 | End: 2024-11-05

## 2024-11-06 RX ORDER — METHYLPREDNISOLONE ACETATE 80 MG/ML
80 INJECTION, SUSPENSION INTRA-ARTICULAR; INTRALESIONAL; INTRAMUSCULAR; SOFT TISSUE ONCE
Status: COMPLETED | OUTPATIENT
Start: 2024-11-06 | End: 2024-11-05

## 2024-11-12 PROBLEM — R15.9 BOWEL INCONTINENCE: Status: ACTIVE | Noted: 2024-11-12

## 2024-11-12 PROBLEM — R71.8 MICROCYTIC RED BLOOD CELLS: Status: ACTIVE | Noted: 2024-11-12

## 2024-11-12 PROBLEM — K57.92 DIVERTICULITIS: Status: ACTIVE | Noted: 2024-11-12

## 2024-11-13 ENCOUNTER — TELEPHONE (OUTPATIENT)
Dept: GASTROENTEROLOGY | Age: 84
End: 2024-11-13

## 2024-11-13 NOTE — TELEPHONE ENCOUNTER
Patient calling stating she needs to r/s appointment for 11/13/2024, she states she is having severe diarrhea and can not make it to the appointment, please call patient to r/s for sooner appointment then next available [phone] psc/sc

## 2025-02-12 ENCOUNTER — OFFICE VISIT (OUTPATIENT)
Dept: GASTROENTEROLOGY | Age: 85
End: 2025-02-12
Payer: MEDICARE

## 2025-02-12 ENCOUNTER — TELEPHONE (OUTPATIENT)
Dept: GASTROENTEROLOGY | Age: 85
End: 2025-02-12

## 2025-02-12 VITALS
DIASTOLIC BLOOD PRESSURE: 77 MMHG | HEIGHT: 61 IN | TEMPERATURE: 97.9 F | OXYGEN SATURATION: 99 % | HEART RATE: 63 BPM | WEIGHT: 156 LBS | RESPIRATION RATE: 20 BRPM | SYSTOLIC BLOOD PRESSURE: 183 MMHG | BODY MASS INDEX: 29.45 KG/M2

## 2025-02-12 DIAGNOSIS — R19.4 ALTERED BOWEL HABITS: Primary | ICD-10-CM

## 2025-02-12 DIAGNOSIS — Z85.038 HISTORY OF COLON CANCER: ICD-10-CM

## 2025-02-12 PROCEDURE — 99214 OFFICE O/P EST MOD 30 MIN: CPT | Performed by: INTERNAL MEDICINE

## 2025-02-12 PROCEDURE — 3078F DIAST BP <80 MM HG: CPT | Performed by: INTERNAL MEDICINE

## 2025-02-12 PROCEDURE — 1159F MED LIST DOCD IN RCRD: CPT | Performed by: INTERNAL MEDICINE

## 2025-02-12 PROCEDURE — 1123F ACP DISCUSS/DSCN MKR DOCD: CPT | Performed by: INTERNAL MEDICINE

## 2025-02-12 PROCEDURE — 3077F SYST BP >= 140 MM HG: CPT | Performed by: INTERNAL MEDICINE

## 2025-02-12 PROCEDURE — 1126F AMNT PAIN NOTED NONE PRSNT: CPT | Performed by: INTERNAL MEDICINE

## 2025-02-12 PROCEDURE — G2211 COMPLEX E/M VISIT ADD ON: HCPCS | Performed by: INTERNAL MEDICINE

## 2025-02-12 RX ORDER — DICYCLOMINE HYDROCHLORIDE 10 MG/1
10 CAPSULE ORAL 4 TIMES DAILY PRN
Qty: 120 CAPSULE | Refills: 0 | Status: SHIPPED | OUTPATIENT
Start: 2025-02-12

## 2025-02-12 ASSESSMENT — ENCOUNTER SYMPTOMS
WHEEZING: 0
NAUSEA: 0
BLOOD IN STOOL: 1
COLOR CHANGE: 0
ABDOMINAL DISTENTION: 1
SORE THROAT: 0
VOMITING: 0
COUGH: 0
RECTAL PAIN: 0
ANAL BLEEDING: 0
TROUBLE SWALLOWING: 0
ABDOMINAL PAIN: 1
DIARRHEA: 1
CONSTIPATION: 0
VOICE CHANGE: 0
CHOKING: 0

## 2025-02-12 NOTE — PROGRESS NOTES
is no tenderness. There is no rebound and no guarding. No hernia.   Musculoskeletal: Normal range of motion.   Lymphadenopathy:    Patient has no cervical adenopathy.   Neurological: Patient is alert and oriented to person, place, and time.   Psychiatric: Patient has a normal mood and affect. Patient behavior is normal.       LABORATORY DATA: Reviewed  Lab Results   Component Value Date    WBC 8.6 07/14/2024    HGB 13.3 07/14/2024    HCT 38.9 07/14/2024    MCV 80.8 07/14/2024     07/14/2024     07/14/2024    K 4.3 07/14/2024     07/14/2024    CO2 24 07/14/2024    BUN 15 07/14/2024    CREATININE 0.7 07/14/2024    BILITOT 0.6 07/14/2024    ALKPHOS 66 07/14/2024    AST 14 07/14/2024    ALT 13 07/14/2024    INR 1.0 07/14/2024         Lab Results   Component Value Date    RBC 4.82 07/14/2024    HGB 13.3 07/14/2024    MCV 80.8 07/14/2024    MCH 27.6 07/14/2024    MCHC 34.2 07/14/2024    RDW 15.3 07/14/2024    MPV 7.9 07/14/2024    BASOPCT 0 07/14/2024    LYMPHSABS 0.83 (L) 07/14/2024    MONOSABS 0.85 07/14/2024    NEUTROABS 11.09 (H) 07/14/2024    EOSABS 0.05 07/14/2024    BASOSABS 0.02 07/14/2024         IMPRESSION: Ms. Arevalo is a 84 y.o. female with a past history remarkable for DM, HTN, colon cancer s/p resection 2004 s/p chemoradiation , referred for evaluation of Diarrhea.     Assessment  1. Altered bowel habits    2. History of colon cancer      -Differentials include but not limited to: IBS vs SIBO vs malabsorption vs infection vs radiation vs microscopic colitis    Plan:  -Obtain stool and blood work to rule out celiac disease, thyroid disorder, pancreatic insufficiency, infection and inflammation  -Obtain x-ray to assess stool burden, purge accordingly and devise a laxative regimen if significant bowel burden  -Obtain hydrogen breath test to rule out SIBO  -Start Bentyl as needed for abdominal cramp  -Discussed fiber however patient noted significant worsening of symptoms, will hold for

## 2025-02-12 NOTE — TELEPHONE ENCOUNTER
Pt was seen today in office. Hydrogen Breath Test ordered. Pt informed order will be faxed to [facility] and they will call pt to schedule.    Writer faxed on 02/13/25 pt's order to [facility], 12 pgs. Writer faxed to [phone] with a successful fax transmission. Writer faxed pt's order, pt demographics, pt insurance card and yesterday's OV notes with Dr. Montiel.

## 2025-03-14 ENCOUNTER — HOSPITAL ENCOUNTER (OUTPATIENT)
Age: 85
Setting detail: SPECIMEN
Discharge: HOME | End: 2025-03-14
Payer: MEDICARE

## 2025-03-14 DIAGNOSIS — R19.4 ALTERED BOWEL HABITS: ICD-10-CM

## 2025-03-14 PROCEDURE — 82653 EL-1 FECAL QUANTITATIVE: CPT

## 2025-03-14 PROCEDURE — 87328 CRYPTOSPORIDIUM AG IA: CPT

## 2025-03-14 PROCEDURE — 87506 IADNA-DNA/RNA PROBE TQ 6-11: CPT

## 2025-03-14 PROCEDURE — 83993 ASSAY FOR CALPROTECTIN FECAL: CPT

## 2025-03-14 PROCEDURE — 87329 GIARDIA AG IA: CPT

## 2025-03-15 LAB
CAMPYLOBACTER DNA SPEC NAA+PROBE: NORMAL
ETEC ELTA+ESTB GENES STL QL NAA+PROBE: NORMAL
P SHIGELLOIDES DNA STL QL NAA+PROBE: NORMAL
SALMONELLA DNA SPEC QL NAA+PROBE: NORMAL
SHIGA TOXIN STX GENE SPEC NAA+PROBE: NORMAL
SHIGELLA DNA SPEC QL NAA+PROBE: NORMAL
SPECIMEN DESCRIPTION: NORMAL
V CHOL+PARA RFBL+TRKH+TNAA STL QL NAA+PR: NORMAL
Y ENTERO RECN STL QL NAA+PROBE: NORMAL

## 2025-03-17 LAB
C PARVUM AG STL QL IA: NEGATIVE
CALPROTECTIN, FECAL: 122 UG/G
G LAMBLIA AG STL QL IA: NEGATIVE
SOURCE: NORMAL
SPECIMEN DESCRIPTION: NORMAL

## 2025-03-18 LAB — FECAL PANCREATIC ELASTASE-1: >800 UG/G

## 2025-04-30 ENCOUNTER — OFFICE VISIT (OUTPATIENT)
Dept: GASTROENTEROLOGY | Age: 85
End: 2025-04-30
Payer: MEDICARE

## 2025-04-30 VITALS
HEIGHT: 61 IN | RESPIRATION RATE: 20 BRPM | WEIGHT: 160 LBS | SYSTOLIC BLOOD PRESSURE: 156 MMHG | HEART RATE: 62 BPM | OXYGEN SATURATION: 98 % | DIASTOLIC BLOOD PRESSURE: 76 MMHG | TEMPERATURE: 97.4 F | BODY MASS INDEX: 30.21 KG/M2

## 2025-04-30 DIAGNOSIS — R19.4 ALTERED BOWEL HABITS: Primary | ICD-10-CM

## 2025-04-30 DIAGNOSIS — Z85.038 HISTORY OF COLON CANCER: ICD-10-CM

## 2025-04-30 PROCEDURE — G2211 COMPLEX E/M VISIT ADD ON: HCPCS | Performed by: INTERNAL MEDICINE

## 2025-04-30 PROCEDURE — 1159F MED LIST DOCD IN RCRD: CPT | Performed by: INTERNAL MEDICINE

## 2025-04-30 PROCEDURE — 1123F ACP DISCUSS/DSCN MKR DOCD: CPT | Performed by: INTERNAL MEDICINE

## 2025-04-30 PROCEDURE — 99213 OFFICE O/P EST LOW 20 MIN: CPT | Performed by: INTERNAL MEDICINE

## 2025-04-30 PROCEDURE — 1126F AMNT PAIN NOTED NONE PRSNT: CPT | Performed by: INTERNAL MEDICINE

## 2025-04-30 PROCEDURE — 3078F DIAST BP <80 MM HG: CPT | Performed by: INTERNAL MEDICINE

## 2025-04-30 PROCEDURE — 3077F SYST BP >= 140 MM HG: CPT | Performed by: INTERNAL MEDICINE

## 2025-04-30 RX ORDER — POLYETHYLENE GLYCOL 3350 17 G/17G
POWDER, FOR SOLUTION ORAL
Qty: 1530 G | Refills: 1 | Status: SHIPPED | OUTPATIENT
Start: 2025-04-30

## 2025-04-30 ASSESSMENT — ENCOUNTER SYMPTOMS
NAUSEA: 0
COLOR CHANGE: 0
RECTAL PAIN: 0
VOICE CHANGE: 0
ABDOMINAL DISTENTION: 1
CONSTIPATION: 0
ABDOMINAL PAIN: 1
SORE THROAT: 0
BLOOD IN STOOL: 1
ANAL BLEEDING: 0
DIARRHEA: 1
WHEEZING: 0
TROUBLE SWALLOWING: 0
CHOKING: 0
COUGH: 0
VOMITING: 0

## 2025-04-30 NOTE — PATIENT INSTRUCTIONS
Take 119 grams of miralax and dissolve in 32 oz of gatorade, drink in 1 day to purge and clean out the colon. Starting next day, take daily miralax 1 capful with 1 glass of water/liquid.

## 2025-04-30 NOTE — PROGRESS NOTES
Reason for Referral:       No referring provider defined for this encounter.    Chief Complaint   Patient presents with    Follow-up     Labs, Stool Tests, XR Abdomen, Hydrogen Breath Test            HISTORY OF PRESENT ILLNESS: Ms.Barbara BEVERLEY Arevalo is a 84 y.o. female with a past history remarkable for DM, HTN, colon cancer s/p resection 2004 s/p chemoradiation , referred for evaluation of Diarrhea.  The patient has been having diarrhea for a long time that she describes as 2 to 3 days of no bowel movement and then multiple episodes of explosive stools throughout the day.  And the cycle repeats.  She was previously given fiber that seem to worsen her symptoms.  She denies any blood in stool.  She has mucus in the stool.  Her last colonoscopy was in 2023 that was unremarkable for any concerning findings.    Interval history 4/30/2025:  Reports that she took the Bentyl and had worsening symptoms.  She also had an x-ray that showed minimal fecal loading.  She has been having altered bowel habits.  She feels that her symptoms are somewhat improving.    CT abd/pelvis:  1.  Diverticulosis.  Minimal fat stranding near the sigmoid colon for which  mild acute diverticulitis should be considered.  2.  Distal colonic anastomosis with nonspecific induration of the presacral  fat, which may be related to prior treatment in this area.  3.  Cholelithiasis without CT evidence for acute cholecystitis.  4.  Multiple indeterminate splenic lesions and indeterminate liver lesion.  In the absence of prior imaging to demonstrate stability of these findings,  further evaluation could be obtained with contrast-enhanced MRI.  5.  Additional chronic and benign findings, as described.     MRI:  1. Benign hepatic, splenic, and renal cysts.  2. Cholelithiasis.  3. Moderate hepatic steatosis.    Previous Endoscopies    Colonoscopy 6/12/2023:  Findings:       The digital rectal exam findings include perianal telangiectasia        consistent

## 2025-06-28 ENCOUNTER — APPOINTMENT (OUTPATIENT)
Dept: CT IMAGING | Age: 85
DRG: 390 | End: 2025-06-28
Payer: MEDICARE

## 2025-06-28 ENCOUNTER — APPOINTMENT (OUTPATIENT)
Dept: GENERAL RADIOLOGY | Age: 85
DRG: 390 | End: 2025-06-28
Payer: MEDICARE

## 2025-06-28 ENCOUNTER — HOSPITAL ENCOUNTER (INPATIENT)
Age: 85
LOS: 9 days | Discharge: HOME HEALTH | DRG: 390 | End: 2025-07-07
Attending: EMERGENCY MEDICINE | Admitting: STUDENT IN AN ORGANIZED HEALTH CARE EDUCATION/TRAINING PROGRAM
Payer: MEDICARE

## 2025-06-28 DIAGNOSIS — K56.609 SMALL BOWEL OBSTRUCTION (HCC): Primary | ICD-10-CM

## 2025-06-28 LAB
ALBUMIN SERPL-MCNC: 4.6 G/DL (ref 3.5–5.2)
ALBUMIN/GLOB SERPL: 1.5 {RATIO} (ref 1–2.5)
ALP SERPL-CCNC: 55 U/L (ref 35–104)
ALT SERPL-CCNC: 17 U/L (ref 10–35)
ANION GAP SERPL CALCULATED.3IONS-SCNC: 16 MMOL/L (ref 9–16)
AST SERPL-CCNC: 28 U/L (ref 10–35)
BASOPHILS # BLD: 0.1 K/UL (ref 0–0.2)
BASOPHILS NFR BLD: 1 % (ref 0–2)
BILIRUB DIRECT SERPL-MCNC: 0.1 MG/DL (ref 0–0.2)
BILIRUB INDIRECT SERPL-MCNC: 0.4 MG/DL (ref 0–1)
BILIRUB SERPL-MCNC: 0.5 MG/DL (ref 0–1.2)
BUN SERPL-MCNC: 27 MG/DL (ref 8–23)
CALCIUM SERPL-MCNC: 10 MG/DL (ref 8.6–10.4)
CHLORIDE SERPL-SCNC: 98 MMOL/L (ref 98–107)
CO2 SERPL-SCNC: 24 MMOL/L (ref 20–31)
CREAT SERPL-MCNC: 1.6 MG/DL (ref 0.6–0.9)
EOSINOPHIL # BLD: 0.1 K/UL (ref 0–0.4)
EOSINOPHILS RELATIVE PERCENT: 1 % (ref 1–4)
ERYTHROCYTE [DISTWIDTH] IN BLOOD BY AUTOMATED COUNT: 15.8 % (ref 12.5–15.4)
GFR, ESTIMATED: 31 ML/MIN/1.73M2
GLUCOSE SERPL-MCNC: 176 MG/DL (ref 74–99)
HCT VFR BLD AUTO: 42.8 % (ref 36–46)
HGB BLD-MCNC: 14.3 G/DL (ref 12–16)
LACTATE BLDV-SCNC: 1.2 MMOL/L (ref 0.5–2.2)
LIPASE SERPL-CCNC: 11 U/L (ref 13–60)
LYMPHOCYTES NFR BLD: 1.1 K/UL (ref 1–4.8)
LYMPHOCYTES RELATIVE PERCENT: 11 % (ref 24–44)
MCH RBC QN AUTO: 27.3 PG (ref 26–34)
MCHC RBC AUTO-ENTMCNC: 33.5 G/DL (ref 31–37)
MCV RBC AUTO: 81.4 FL (ref 80–100)
MONOCYTES NFR BLD: 1 K/UL (ref 0.1–1.2)
MONOCYTES NFR BLD: 9 % (ref 2–11)
NEUTROPHILS NFR BLD: 78 % (ref 36–66)
NEUTS SEG NFR BLD: 8.1 K/UL (ref 1.8–7.7)
PLATELET # BLD AUTO: 291 K/UL (ref 140–450)
PMV BLD AUTO: 7.9 FL (ref 6–12)
POTASSIUM SERPL-SCNC: 4.4 MMOL/L (ref 3.7–5.3)
PROT SERPL-MCNC: 7.6 G/DL (ref 6.6–8.7)
RBC # BLD AUTO: 5.26 M/UL (ref 4–5.2)
SODIUM SERPL-SCNC: 138 MMOL/L (ref 136–145)
TROPONIN I SERPL HS-MCNC: 29 NG/L (ref 0–14)
WBC OTHER # BLD: 10.3 K/UL (ref 3.5–11)

## 2025-06-28 PROCEDURE — 0D9670Z DRAINAGE OF STOMACH WITH DRAINAGE DEVICE, VIA NATURAL OR ARTIFICIAL OPENING: ICD-10-PCS | Performed by: SURGERY

## 2025-06-28 PROCEDURE — 99222 1ST HOSP IP/OBS MODERATE 55: CPT | Performed by: STUDENT IN AN ORGANIZED HEALTH CARE EDUCATION/TRAINING PROGRAM

## 2025-06-28 PROCEDURE — 80076 HEPATIC FUNCTION PANEL: CPT

## 2025-06-28 PROCEDURE — 2500000003 HC RX 250 WO HCPCS: Performed by: STUDENT IN AN ORGANIZED HEALTH CARE EDUCATION/TRAINING PROGRAM

## 2025-06-28 PROCEDURE — 74018 RADEX ABDOMEN 1 VIEW: CPT

## 2025-06-28 PROCEDURE — 36415 COLL VENOUS BLD VENIPUNCTURE: CPT

## 2025-06-28 PROCEDURE — 83690 ASSAY OF LIPASE: CPT

## 2025-06-28 PROCEDURE — 6360000002 HC RX W HCPCS: Performed by: STUDENT IN AN ORGANIZED HEALTH CARE EDUCATION/TRAINING PROGRAM

## 2025-06-28 PROCEDURE — 2580000003 HC RX 258: Performed by: EMERGENCY MEDICINE

## 2025-06-28 PROCEDURE — 1200000000 HC SEMI PRIVATE

## 2025-06-28 PROCEDURE — 85025 COMPLETE CBC W/AUTO DIFF WBC: CPT

## 2025-06-28 PROCEDURE — 74176 CT ABD & PELVIS W/O CONTRAST: CPT

## 2025-06-28 PROCEDURE — 93005 ELECTROCARDIOGRAM TRACING: CPT | Performed by: EMERGENCY MEDICINE

## 2025-06-28 PROCEDURE — 83605 ASSAY OF LACTIC ACID: CPT

## 2025-06-28 PROCEDURE — 51798 US URINE CAPACITY MEASURE: CPT

## 2025-06-28 PROCEDURE — 99285 EMERGENCY DEPT VISIT HI MDM: CPT

## 2025-06-28 PROCEDURE — 80048 BASIC METABOLIC PNL TOTAL CA: CPT

## 2025-06-28 PROCEDURE — 2580000003 HC RX 258: Performed by: STUDENT IN AN ORGANIZED HEALTH CARE EDUCATION/TRAINING PROGRAM

## 2025-06-28 PROCEDURE — 84484 ASSAY OF TROPONIN QUANT: CPT

## 2025-06-28 RX ORDER — ONDANSETRON 2 MG/ML
4 INJECTION INTRAMUSCULAR; INTRAVENOUS EVERY 6 HOURS PRN
Status: DISCONTINUED | OUTPATIENT
Start: 2025-06-28 | End: 2025-07-07 | Stop reason: HOSPADM

## 2025-06-28 RX ORDER — 0.9 % SODIUM CHLORIDE 0.9 %
500 INTRAVENOUS SOLUTION INTRAVENOUS ONCE
Status: COMPLETED | OUTPATIENT
Start: 2025-06-28 | End: 2025-06-28

## 2025-06-28 RX ORDER — ACETAMINOPHEN 325 MG/1
650 TABLET ORAL EVERY 6 HOURS PRN
Status: DISCONTINUED | OUTPATIENT
Start: 2025-06-28 | End: 2025-07-07 | Stop reason: HOSPADM

## 2025-06-28 RX ORDER — LABETALOL HYDROCHLORIDE 5 MG/ML
10 INJECTION, SOLUTION INTRAVENOUS EVERY 6 HOURS PRN
Status: DISCONTINUED | OUTPATIENT
Start: 2025-06-28 | End: 2025-07-04

## 2025-06-28 RX ORDER — ACETAMINOPHEN 650 MG/1
650 SUPPOSITORY RECTAL EVERY 6 HOURS PRN
Status: DISCONTINUED | OUTPATIENT
Start: 2025-06-28 | End: 2025-07-07 | Stop reason: HOSPADM

## 2025-06-28 RX ORDER — POLYETHYLENE GLYCOL 3350 17 G/17G
17 POWDER, FOR SOLUTION ORAL DAILY PRN
Status: DISCONTINUED | OUTPATIENT
Start: 2025-06-28 | End: 2025-07-07

## 2025-06-28 RX ORDER — SODIUM CHLORIDE 0.9 % (FLUSH) 0.9 %
5-40 SYRINGE (ML) INJECTION PRN
Status: DISCONTINUED | OUTPATIENT
Start: 2025-06-28 | End: 2025-07-07 | Stop reason: HOSPADM

## 2025-06-28 RX ORDER — ONDANSETRON 4 MG/1
4 TABLET, ORALLY DISINTEGRATING ORAL EVERY 8 HOURS PRN
Status: DISCONTINUED | OUTPATIENT
Start: 2025-06-28 | End: 2025-07-07 | Stop reason: HOSPADM

## 2025-06-28 RX ORDER — SODIUM CHLORIDE 9 MG/ML
INJECTION, SOLUTION INTRAVENOUS CONTINUOUS
Status: ACTIVE | OUTPATIENT
Start: 2025-06-28 | End: 2025-06-29

## 2025-06-28 RX ORDER — METOPROLOL TARTRATE 1 MG/ML
5 INJECTION, SOLUTION INTRAVENOUS EVERY 6 HOURS PRN
Status: DISCONTINUED | OUTPATIENT
Start: 2025-06-28 | End: 2025-07-07 | Stop reason: HOSPADM

## 2025-06-28 RX ORDER — DIPHENHYDRAMINE HYDROCHLORIDE 50 MG/ML
25 INJECTION, SOLUTION INTRAMUSCULAR; INTRAVENOUS ONCE
Status: COMPLETED | OUTPATIENT
Start: 2025-06-29 | End: 2025-06-29

## 2025-06-28 RX ORDER — SODIUM CHLORIDE 9 MG/ML
INJECTION, SOLUTION INTRAVENOUS PRN
Status: DISCONTINUED | OUTPATIENT
Start: 2025-06-28 | End: 2025-07-07 | Stop reason: HOSPADM

## 2025-06-28 RX ORDER — SODIUM CHLORIDE 0.9 % (FLUSH) 0.9 %
5-40 SYRINGE (ML) INJECTION EVERY 12 HOURS SCHEDULED
Status: DISCONTINUED | OUTPATIENT
Start: 2025-06-28 | End: 2025-07-07 | Stop reason: HOSPADM

## 2025-06-28 RX ADMIN — SODIUM CHLORIDE 500 ML: 0.9 INJECTION, SOLUTION INTRAVENOUS at 14:18

## 2025-06-28 RX ADMIN — SODIUM CHLORIDE, PRESERVATIVE FREE 40 MG: 5 INJECTION INTRAVENOUS at 18:12

## 2025-06-28 RX ADMIN — SODIUM CHLORIDE: 0.9 INJECTION, SOLUTION INTRAVENOUS at 16:31

## 2025-06-28 ASSESSMENT — PAIN - FUNCTIONAL ASSESSMENT: PAIN_FUNCTIONAL_ASSESSMENT: 0-10

## 2025-06-28 ASSESSMENT — PAIN SCALES - GENERAL: PAINLEVEL_OUTOF10: 3

## 2025-06-28 NOTE — ED NOTES
ED to inpatient nurses report      Chief Complaint:  Chief Complaint   Patient presents with    Abdominal Cramping    Decreased appetite     Patient states on Thursday she started having increased decreased appetite, abd cramping. Patient states she was dx with a UTI put on antibiotics on June 12th, for UTI, went to [facility] ER on Monday, dx with UTI, given Cipro, then abd cramping, decreased appetite, increased weakness, and some \"memory issues\".      Extremity Weakness     Present to ED from: Home alone    MOA:     LOC: alert and orientated to name, place, date  Mobility: Independent  Oxygen Baseline: none    Current needs required: none    Pending ED orders: Urine  Present condition: Stable    Why did the patient come to the ED? Abdominal pain     What is the plan? See general surgery    Any procedures or intervention occur? NG, labs, CT abdomen    Pertinent event(s) NG placed     Safety concerns??Fall risk    CODE STATUS Prior    Diet No diet orders on file    Mental Status:  Level of Consciousness: Alert (0)    Psych Assessment:      Vital signs   Vitals:    06/28/25 1143   BP: 114/65   Pulse: 92   Resp: 18   Temp: 98.1 °F (36.7 °C)   SpO2: 94%   Weight: 68 kg (150 lb)   Height: 1.549 m (5' 1\")        Vitals:  Patient Vitals for the past 24 hrs:   BP Temp Pulse Resp SpO2 Height Weight   06/28/25 1143 114/65 98.1 °F (36.7 °C) 92 18 94 % 1.549 m (5' 1\") 68 kg (150 lb)      Visit Vitals  /65   Pulse 92   Temp 98.1 °F (36.7 °C)   Resp 18   Ht 1.549 m (5' 1\")   Wt 68 kg (150 lb)   SpO2 94%   BMI 28.34 kg/m²        LDAs:   Peripheral IV 06/28/25 Right Antecubital (Active)   Site Assessment Clean, dry & intact 06/28/25 1239   Line Status Blood return noted;Brisk blood return;Normal saline locked;Specimen collected 06/28/25 1239   Line Care Connections checked and tightened 06/28/25 1239   Phlebitis Assessment No symptoms 06/28/25 1239   Infiltration Assessment 0 06/28/25 1239   Dressing Status New

## 2025-06-28 NOTE — H&P
[facility]  Office: [phone]  Curt Venegas DO, Ablerto Frye, DO, William Fiore DO, Lucho Panda DO, Emily Carbone MD, Briana Velásquez MD, Mejia Trevino MD, Raissa Randolph MD,  Dilan Villasenor MD, xOana Chau MD, Bertrand Millan MD,  Hank Beauchamp DO, Herminia Garzon MD, Ulisses Hicks MD, Shree Venegas DO, Chichi Lynch MD,  Jaden Bay DO, Erica Delgado MD, Farrah Johnson MD, Jim Randle MD,  Tanner Ivey MD, Eusebio Esparza MD, Geoffrey Jung MD, Amish Lundy MD, Lavell Ferreira MD, Hal Medrano MD, Olaf Regalado, DO, Suzi Waggoner MD, Harris Brush DO, Jewel Edmond MD, Hank Germain MD, Mohsin Reza, MD, Mike Dhaliwal MD, Shirley Waterhouse, CNP,  Becka Brown, CNP, Olaf Ragsdale, CNP,  Chikis Perry, RED, Ximena Phelps, CNP, Katey Woo, CNP, Tamiko Noel, CNP, Trish Goodman, CNP, Theresa Kraus, PA-C, Mikki Joshua, CNP, Vikram Davis, CNP,  Danielle Portillo, CNP, Jane Kaur, CNP, Jb Fairbanks, PA-C, Charity Escobar, PA-C, Katalina Henderson, CNP,        Sherita Amaya, SHERIDAN, Hailey Agarwal, CNP, Iram Smith, CNP         [facility]   IN-PATIENT SERVICE   [facility]    HISTORY AND PHYSICAL EXAMINATION            Date:   6/28/2025  Patient name:  Barbie Arevalo  Date of admission:  6/28/2025 11:18 AM  MRN:   [id number]  Account:  [id number]  YOB: 1940  PCP:    Harley Peralta MD  Room:   [address] [place] [address]  Code Status:    Full Code    Chief Complaint:     Chief Complaint   Patient presents with    Abdominal Cramping    Decreased appetite     Patient states on Thursday she started having increased decreased appetite, abd cramping. Patient states she was dx with a UTI put on antibiotics on June 12th, for UTI, went to [facility] ER on Monday, dx with UTI, given Cipro, then abd cramping, decreased appetite, increased weakness, and some \"memory issues\".      Extremity Weakness       History Obtained From:

## 2025-06-28 NOTE — ED PROVIDER NOTES
[facility] Emergency Department  EMERGENCY DEPARTMENT ENCOUNTER      Pt Name: Barbie Arevalo  MRN: [id number]  Birthdate 1940  Date of evaluation: 6/28/2025    CHIEF COMPLAINT       Chief Complaint   Patient presents with    Abdominal Cramping    Decreased appetite     Patient states on Thursday she started having increased decreased appetite, abd cramping. Patient states she was dx with a UTI put on antibiotics on June 12th, for UTI, went to [facility] ER on Monday, dx with UTI, given Cipro, then abd cramping, decreased appetite, increased weakness, and some \"memory issues\".      Extremity Weakness        HISTORY OF PRESENT ILLNESS      Barbie Arevalo is a 84 y.o. female who presents to the emergency department complaining of cramping in her upper abdomen that started Thursday afternoon.  Pain nonradiating.  Started around 2:00 in the afternoon.  She had not had lunch because she was not feeling well.  She did have breakfast.  She has not had an appetite ever since.  She denies any fevers or chills no diarrhea but does feel bloated and constipated.  Last bowel movement was on Wednesday.  She denies any chest pain or shortness of breath.  Previous abdominal surgeries including a colon resection back in 2004 for colon cancer.  She has also had 2 urinary tract infections over the past few weeks.  She has 1 pill left of Cipro.  She is also complaining of feeling weak and wobbly.     REVIEW OF SYSTEMS       CONSTITUTIONAL: No fevers or chills   HENT: No sore throat, rhinorrhea, or earache   EYES: No blurry vision or double vision no drainage   CARDIOVASCULAR: No chest pain or tachycardia   RESPIRATORY: No wheezing or shortness of breath no cough   GASTROINTESTINAL: Positive nausea, no vomiting, diarrhea, positive constipation, positive abdominal cramping no abdominal pain   : No hematuria or dysuria   MUSCULOSKELETAL: No swelling or pain   SKIN: No rash   NEUROLOGICAL: No focal neurologic  complaints, paresthesias, weakness, or headache      PAST MEDICAL HISTORY    has a past medical history of Diabetes 1.5, managed as type 2 (HCC) and Hypertension.    SURGICAL HISTORY      has no past surgical history on file.    CURRENT MEDICATIONS       Previous Medications    ASPIRIN 81 MG CHEWABLE TABLET    Take 1 tablet by mouth daily    BISMUTH SUBSALICYLATE (BISMATROL) 262 MG/15ML SUSPENSION    Take 30 mLs by mouth 4 times daily as needed for Indigestion    EZETIMIBE (ZETIA) 10 MG TABLET        GLIMEPIRIDE (AMARYL) 2 MG TABLET    Take 1 tablet by mouth 2 times daily    HYDROCHLOROTHIAZIDE 12.5 MG TABLET        LOPERAMIDE (IMODIUM) 2 MG CAPSULE    Take 1 capsule by mouth as needed for Diarrhea    METOPROLOL TARTRATE (LOPRESSOR) 50 MG TABLET    Take 1 tablet by mouth 2 times daily    POLYETHYLENE GLYCOL (GLYCOLAX) 17 GM/SCOOP POWDER    Take 238 grams of miralax and dissolve in 64 oz of gatorade, drink in 1 day to purge and clean out the colon. Starting next day, take daily miralax 1 capful with 1 glass of water/liquid.    VALSARTAN (DIOVAN) 160 MG TABLET    Take 1 tablet by mouth daily    VALSARTAN (DIOVAN) 320 MG TABLET           ALLERGIES     is allergic to bee venom and statins.    FAMILY HISTORY     has no family status information on file.      family history is not on file.    SOCIAL HISTORY      reports that she has never smoked. She has never used smokeless tobacco. She reports that she does not use drugs.    PHYSICAL EXAM       ED Triage Vitals [06/28/25 1143]   BP Systolic BP Percentile Diastolic BP Percentile Temp Temp src Pulse Respirations SpO2   114/65 -- -- 98.1 °F (36.7 °C) -- 92 18 94 %      Height Weight - Scale         1.549 m (5' 1\") 68 kg (150 lb)           /65   Pulse 92   Temp 98.1 °F (36.7 °C)   Resp 18   Ht 1.549 m (5' 1\")   Wt 68 kg (150 lb)   SpO2 94%   BMI 28.34 kg/m²      CONSTITUTIONAL: Alert, oriented x3, nontoxic, answering questions appropriately, acting properly for

## 2025-06-28 NOTE — CONSULTS
Chief Complaint   Patient presents with    Abdominal Cramping    Decreased appetite     Patient states on Thursday she started having increased decreased appetite, abd cramping. Patient states she was dx with a UTI put on antibiotics on June 12th, for UTI, went to [facility] ER on Monday, dx with UTI, given Cipro, then abd cramping, decreased appetite, increased weakness, and some \"memory issues\".      Extremity Weakness       HxCC:  83 y/o female admitted for small bowel obstruction.  Patient has hx of colectomy for colon cancer 21 years ago.  Patient is unsure of the details of surgery.  Patient developed abdominal cramping on Thursday.  Patient's last bowel movement was Wednesday  complains of nausea.  Patient has hx of irregular bowel movements  Not uncommon for patient to have periods of constipation with intermittent diarrhea.  Patient was seen and treated for UTI on Monday.      Vitals:    06/28/25 1143   BP: 114/65   Pulse: 92   Resp: 18   Temp: 98.1 °F (36.7 °C)   SpO2: 94%       Patient Active Problem List   Diagnosis    Blood per rectum    Hypertension    Type 2 diabetes mellitus (HCC)    Gastrointestinal hemorrhage    Anxiety    Arthritis    Irritable bowel syndrome with diarrhea    Mixed hyperlipidemia    Supraventricular premature beats    Type 2 diabetes mellitus with hyperglycemia (HCC)    Pre-syncope    Bowel incontinence    Diverticulitis    Microcytic red blood cells    SBO (small bowel obstruction) ([facility])       No current facility-administered medications for this encounter.     Current Outpatient Medications   Medication Sig Dispense Refill    valsartan (DIOVAN) 320 MG tablet       ezetimibe (ZETIA) 10 MG tablet       hydroCHLOROthiazide 12.5 MG tablet       valsartan (DIOVAN) 160 MG tablet Take 1 tablet by mouth daily 30 tablet 3    metoprolol tartrate (LOPRESSOR) 50 MG tablet Take 1 tablet by mouth 2 times daily      glimepiride (AMARYL) 2 MG tablet Take 1 tablet by mouth 2

## 2025-06-29 LAB
ANION GAP SERPL CALCULATED.3IONS-SCNC: 12 MMOL/L (ref 9–16)
ANION GAP SERPL CALCULATED.3IONS-SCNC: 14 MMOL/L (ref 9–17)
BACTERIA URNS QL MICRO: ABNORMAL
BASOPHILS # BLD: 0.1 K/UL (ref 0–0.2)
BASOPHILS NFR BLD: 1 % (ref 0–2)
BILIRUB UR QL STRIP: ABNORMAL
BUN SERPL-MCNC: 33 MG/DL (ref 8–23)
BUN SERPL-MCNC: 33 MG/DL (ref 8–23)
CALCIUM SERPL-MCNC: 9.1 MG/DL (ref 8.6–10.4)
CALCIUM SERPL-MCNC: 9.1 MG/DL (ref 8.6–10.4)
CHARACTER UR: ABNORMAL
CHLORIDE SERPL-SCNC: 103 MMOL/L (ref 98–107)
CHLORIDE SERPL-SCNC: 104 MMOL/L (ref 98–107)
CLARITY UR: CLEAR
CO2 SERPL-SCNC: 21 MMOL/L (ref 20–31)
CO2 SERPL-SCNC: 23 MMOL/L (ref 20–31)
COLOR UR: YELLOW
CREAT SERPL-MCNC: 1.5 MG/DL (ref 0.6–0.9)
CREAT SERPL-MCNC: 1.7 MG/DL (ref 0.6–0.9)
EKG ATRIAL RATE: 86 BPM
EKG P AXIS: 87 DEGREES
EKG P-R INTERVAL: 186 MS
EKG Q-T INTERVAL: 374 MS
EKG QRS DURATION: 90 MS
EKG QTC CALCULATION (BAZETT): 447 MS
EKG R AXIS: -16 DEGREES
EKG T AXIS: 109 DEGREES
EKG VENTRICULAR RATE: 86 BPM
EOSINOPHIL # BLD: 0.1 K/UL (ref 0–0.4)
EOSINOPHILS RELATIVE PERCENT: 1 % (ref 1–4)
EPI CELLS #/AREA URNS HPF: ABNORMAL /HPF (ref 0–5)
ERYTHROCYTE [DISTWIDTH] IN BLOOD BY AUTOMATED COUNT: 15.3 % (ref 12.5–15.4)
GFR, ESTIMATED: 29 ML/MIN/1.73M2
GFR, ESTIMATED: 34 ML/MIN/1.73M2
GLUCOSE BLD-MCNC: 140 MG/DL (ref 65–105)
GLUCOSE SERPL-MCNC: 158 MG/DL (ref 74–99)
GLUCOSE SERPL-MCNC: 163 MG/DL (ref 74–99)
GLUCOSE UR STRIP-MCNC: NEGATIVE MG/DL
HCT VFR BLD AUTO: 37.5 % (ref 36–46)
HGB BLD-MCNC: 12.6 G/DL (ref 12–16)
HGB UR QL STRIP.AUTO: NEGATIVE
KETONES UR STRIP-MCNC: NEGATIVE MG/DL
LACTATE BLDV-SCNC: 0.9 MMOL/L (ref 0.5–2.2)
LEUKOCYTE ESTERASE UR QL STRIP: NEGATIVE
LYMPHOCYTES NFR BLD: 1 K/UL (ref 1–4.8)
LYMPHOCYTES RELATIVE PERCENT: 12 % (ref 24–44)
MCH RBC QN AUTO: 27.4 PG (ref 26–34)
MCHC RBC AUTO-ENTMCNC: 33.5 G/DL (ref 31–37)
MCV RBC AUTO: 81.7 FL (ref 80–100)
MONOCYTES NFR BLD: 1 K/UL (ref 0.1–1.2)
MONOCYTES NFR BLD: 12 % (ref 2–11)
NEUTROPHILS NFR BLD: 74 % (ref 36–66)
NEUTS SEG NFR BLD: 6.1 K/UL (ref 1.8–7.7)
NITRITE UR QL STRIP: NEGATIVE
PH UR STRIP: 5.5 [PH] (ref 5–8)
PLATELET # BLD AUTO: 230 K/UL (ref 140–450)
PMV BLD AUTO: 7.1 FL (ref 6–12)
POTASSIUM SERPL-SCNC: 4 MMOL/L (ref 3.7–5.3)
POTASSIUM SERPL-SCNC: 4.2 MMOL/L (ref 3.7–5.3)
PROT UR STRIP-MCNC: NEGATIVE MG/DL
RBC # BLD AUTO: 4.6 M/UL (ref 4–5.2)
RBC #/AREA URNS HPF: ABNORMAL /HPF (ref 0–2)
SODIUM SERPL-SCNC: 138 MMOL/L (ref 136–145)
SODIUM SERPL-SCNC: 139 MMOL/L (ref 136–145)
SP GR UR STRIP: >1.03 (ref 1–1.03)
TROPONIN I SERPL HS-MCNC: 30 NG/L (ref 0–14)
UROBILINOGEN UR STRIP-ACNC: NORMAL EU/DL (ref 0–1)
WBC #/AREA URNS HPF: ABNORMAL /HPF (ref 0–5)
WBC OTHER # BLD: 8.2 K/UL (ref 3.5–11)

## 2025-06-29 PROCEDURE — 1200000000 HC SEMI PRIVATE

## 2025-06-29 PROCEDURE — 85025 COMPLETE CBC W/AUTO DIFF WBC: CPT

## 2025-06-29 PROCEDURE — 6360000002 HC RX W HCPCS: Performed by: NURSE PRACTITIONER

## 2025-06-29 PROCEDURE — 84484 ASSAY OF TROPONIN QUANT: CPT

## 2025-06-29 PROCEDURE — 36415 COLL VENOUS BLD VENIPUNCTURE: CPT

## 2025-06-29 PROCEDURE — 97166 OT EVAL MOD COMPLEX 45 MIN: CPT

## 2025-06-29 PROCEDURE — 2580000003 HC RX 258: Performed by: STUDENT IN AN ORGANIZED HEALTH CARE EDUCATION/TRAINING PROGRAM

## 2025-06-29 PROCEDURE — 97161 PT EVAL LOW COMPLEX 20 MIN: CPT

## 2025-06-29 PROCEDURE — 2500000003 HC RX 250 WO HCPCS: Performed by: STUDENT IN AN ORGANIZED HEALTH CARE EDUCATION/TRAINING PROGRAM

## 2025-06-29 PROCEDURE — 97116 GAIT TRAINING THERAPY: CPT

## 2025-06-29 PROCEDURE — 99232 SBSQ HOSP IP/OBS MODERATE 35: CPT | Performed by: STUDENT IN AN ORGANIZED HEALTH CARE EDUCATION/TRAINING PROGRAM

## 2025-06-29 PROCEDURE — 2580000003 HC RX 258

## 2025-06-29 PROCEDURE — 6360000002 HC RX W HCPCS: Performed by: STUDENT IN AN ORGANIZED HEALTH CARE EDUCATION/TRAINING PROGRAM

## 2025-06-29 PROCEDURE — 97535 SELF CARE MNGMENT TRAINING: CPT

## 2025-06-29 PROCEDURE — 80048 BASIC METABOLIC PNL TOTAL CA: CPT

## 2025-06-29 PROCEDURE — 87086 URINE CULTURE/COLONY COUNT: CPT

## 2025-06-29 PROCEDURE — 82947 ASSAY GLUCOSE BLOOD QUANT: CPT

## 2025-06-29 PROCEDURE — 83605 ASSAY OF LACTIC ACID: CPT

## 2025-06-29 PROCEDURE — 93010 ELECTROCARDIOGRAM REPORT: CPT | Performed by: INTERNAL MEDICINE

## 2025-06-29 PROCEDURE — 81001 URINALYSIS AUTO W/SCOPE: CPT

## 2025-06-29 PROCEDURE — 51798 US URINE CAPACITY MEASURE: CPT

## 2025-06-29 RX ORDER — DIPHENHYDRAMINE HYDROCHLORIDE 50 MG/ML
25 INJECTION, SOLUTION INTRAMUSCULAR; INTRAVENOUS EVERY 6 HOURS PRN
Status: DISCONTINUED | OUTPATIENT
Start: 2025-06-29 | End: 2025-06-30

## 2025-06-29 RX ORDER — SODIUM CHLORIDE 9 MG/ML
INJECTION, SOLUTION INTRAVENOUS CONTINUOUS
Status: ACTIVE | OUTPATIENT
Start: 2025-06-29 | End: 2025-07-01

## 2025-06-29 RX ADMIN — SODIUM CHLORIDE: 0.9 INJECTION, SOLUTION INTRAVENOUS at 23:58

## 2025-06-29 RX ADMIN — DIPHENHYDRAMINE HYDROCHLORIDE 25 MG: 50 INJECTION INTRAMUSCULAR; INTRAVENOUS at 00:14

## 2025-06-29 RX ADMIN — WATER 1000 MG: 1 INJECTION INTRAMUSCULAR; INTRAVENOUS; SUBCUTANEOUS at 10:25

## 2025-06-29 RX ADMIN — SODIUM CHLORIDE, PRESERVATIVE FREE 10 ML: 5 INJECTION INTRAVENOUS at 22:41

## 2025-06-29 RX ADMIN — DIPHENHYDRAMINE HYDROCHLORIDE 25 MG: 50 INJECTION INTRAMUSCULAR; INTRAVENOUS at 22:36

## 2025-06-29 RX ADMIN — SODIUM CHLORIDE: 0.9 INJECTION, SOLUTION INTRAVENOUS at 06:24

## 2025-06-29 RX ADMIN — SODIUM CHLORIDE, PRESERVATIVE FREE 40 MG: 5 INJECTION INTRAVENOUS at 07:50

## 2025-06-29 NOTE — PROGRESS NOTES
PATIENT NAME: Barbie Arevalo     TODAY'S DATE: 6/29/2025, 8:59 AM    SUBJECTIVE:    85 y/o female admitted for small bowel obstruction doing well this am.  Abdominal pain resolving.  Denies nausea or vomiting.  Patient has not passed flatus yet.  Denies fevers, chills, or sweats.       OBJECTIVE:   VITALS:  BP (!) 157/57   Pulse 80   Temp 97.5 °F (36.4 °C) (Oral)   Resp 16   Ht 1.549 m (5' 1\")   Wt 69.5 kg (153 lb 3.5 oz)   SpO2 92%   BMI 28.95 kg/m²      INTAKE/OUTPUT:      Intake/Output Summary (Last 24 hours) at 6/29/2025 0859  Last data filed at 6/29/2025 0549  Gross per 24 hour   Intake 986 ml   Output 300 ml   Net 686 ml                 CONSTITUTIONAL:  awake and alert.  No acute distress.  Patient is good spirits.  Patient is not toxic or ill appearing.  ABDOMEN:   Abdomen soft and nontender.  Left sided abdominal pain from yesterday resolving.  Patient has improved abdominal distention compared to yesterday.      Data:  CBC:   Lab Results   Component Value Date/Time    WBC 8.2 06/29/2025 07:03 AM    RBC 4.60 06/29/2025 07:03 AM    HGB 12.6 06/29/2025 07:03 AM    HCT 37.5 06/29/2025 07:03 AM    MCV 81.7 06/29/2025 07:03 AM    MCH 27.4 06/29/2025 07:03 AM    MCHC 33.5 06/29/2025 07:03 AM    RDW 15.3 06/29/2025 07:03 AM     06/29/2025 07:03 AM    MPV 7.1 06/29/2025 07:03 AM     Component  Ref Range & Units (hover) 6/29/25 0703 6/28/25 2117   Lactic Acid 0.9 1.2       ASSESSMENT   Small bowel obstruction    Plan  Patient is clinically doing well.  Abdominal pain and nausea is resolving.  Patient has no abdominal pain on palpation.  Patient has no leukocytosis and no elevated lactic acid.  Patient does not have an acute surgical abdomen.  Will continue to treat nonsurgically with ng tube decompression.  Will continue to follow with serial abdominal exams.      Electronically signed by Kyle Humphreys IV, DO  on 6/29/2025 at 8:59 AM

## 2025-06-29 NOTE — CARE COORDINATION
Case Management Assessment  Initial Evaluation    Date/Time of Evaluation: 6/29/2025 3:28 PM  Assessment Completed by: Nicholas Flores    If patient is discharged prior to next notation, then this note serves as note for discharge by case management.    Patient Name: Barbie Arevalo                   YOB: 1940  Diagnosis: Small bowel obstruction (HCC) [K56.609]  SBO (small bowel obstruction) (HCC) [K56.609]                   Date / Time: 6/28/2025 11:18 AM    Patient Admission Status: Inpatient   Readmission Risk (Low < 19, Mod (19-27), High > 27): Readmission Risk Score: 11.7    Current PCP: Harley Peralta MD  PCP verified by CM? (P) Yes    Chart Reviewed: Yes      History Provided by: (P) Patient  Patient Orientation: (P) Alert and Oriented    Patient Cognition: (P) Alert    Hospitalization in the last 30 days (Readmission):  No    If yes, Readmission Assessment in CM Navigator will be completed.    Advance Directives:      Code Status: Full Code   Patient's Primary Decision Maker is: (P) Legal Next of Kin      Discharge Planning:    Patient lives with: (P) Alone Type of Home: (P) House  Primary Care Giver: (P) Self  Patient Support Systems include: (P) Family Members   Current Financial resources: (P) Medicare  Current community resources: (P) None  Current services prior to admission: (P) Durable Medical Equipment            Current DME: (P) Cane            Type of Home Care services:  (P) None    ADLS  Prior functional level: (P) Independent in ADLs/IADLs  Current functional level: (P) Independent in ADLs/IADLs    PT AM-PAC: 18 /24  OT AM-PAC: 19 /24    Family can provide assistance at DC: (P) Yes  Would you like Case Management to discuss the discharge plan with any other family members/significant others, and if so, who? (P) No  Plans to Return to Present Housing: (P) Yes  Other Identified Issues/Barriers to RETURNING to current housing: clinical status   Potential Assistance needed at

## 2025-06-29 NOTE — PROGRESS NOTES
Physical Therapy  Facility/Department: [address]   Physical Therapy Initial Evaluation    Patient Name: Barbie Arevalo        MRN: [id number]    : 1940    Date of Service: 2025    Chief Complaint   Patient presents with    Abdominal Cramping    Decreased appetite     Patient states on Thursday she started having increased decreased appetite, abd cramping. Patient states she was dx with a UTI put on antibiotics on , for UTI, went to [facility] on Monday, dx with UTI, given Cipro, then abd cramping, decreased appetite, increased weakness, and some \"memory issues\".      Extremity Weakness     Past Medical History:  has a past medical history of Diabetes 1.5, managed as type 2 (HCC) and Hypertension.  Past Surgical History:  has no past surgical history on file.    Discharge Recommendations  Discharge Recommendations: No therapy recommended at discharge  PT Equipment Recommendations  Equipment Needed: No  Other: has cane if needed    Assessment  Body Structures, Functions, Activity Limitations Requiring Skilled Therapeutic Intervention: Decreased functional mobility   Assessment: The patient was admitted due to SBO with a hx of colectomy 21 years ago. At baseline, the patient lives alone and requires no assistance with ADLs and functional mobility. The patient resides in a 1 story home with laundry in the basement and 2 steps to enter. The patient ambulates with cane when in community and no device in her house at baseline. The patient currently has NG to intermittent suction and it was OK to clamp for PT session. During session, the patient was able to perform bed mobility with SBA with HOB slightly elevated, transfers with CGA and cane on R, and ambulate 90ft with CGA and cane on R + hand held assistance on L. The patient would benefit from acute PT to improve functional independence back to baseline. The patient would be appropriate to return to prior living arrangements with  Function  Social/Functional History  Lives With: Alone  Type of Home: House  Home Layout: One level, Laundry in basement  Home Access: Stairs to enter with rails  Entrance Stairs - Number of Steps: 2  Entrance Stairs - Rails: Both  Bathroom Shower/Tub: Tub/Shower unit (uses tub only)  Bathroom Toilet: Handicap height  Bathroom Equipment: Grab bars in shower, Hand-held shower  Bathroom Accessibility: Walker accessible  Home Equipment: Cane  Has the patient had two or more falls in the past year or any fall with injury in the past year?: No  Prior Level of Assist for ADLs: Independent  Prior Level of Assist for Homemaking: Independent  Homemaking Responsibilities: Yes  Prior Level of Assist for Ambulation:  (cane mostly when in community)  Prior Level of Assist for Transfers: Independent  Active : Yes  Mode of Transportation: ATEME  Occupation: Retired  Type of Occupation: [employer] , furniture sales  Leisure & Hobbies: reading, TV  Additional Comments: R handed    Vision/Hearing  Vision  Vision: Impaired  Vision Exceptions: Wears glasses for reading, Wears glasses for distance (driving)  Hearing  Hearing: Within functional limits    Objective  Orientation  Overall Orientation Status: Within Functional Limits  Orientation Level: Oriented X4  Cognition  Overall Cognitive Status: WFL    Observation/Palpation  Posture: Good    AROM RLE (degrees)  RLE AROM: WFL  AROM LLE (degrees)  LLE AROM : WFL    Strength RLE  Strength RLE: WFL  Strength LLE  Strength LLE: WFL    Mobility   Bed mobility  Supine to Sit: Stand by assistance  Sit to Supine: Stand by assistance  Scooting: Stand by assistance  Bed Mobility Comments: HOB slightly elevated    Transfers  Sit to Stand: Contact guard assistance  Stand to Sit: Contact guard assistance  Comment: cane    Ambulation  Surface: Level tile  Device: Single point cane;Hand-Held Assist (cane on R, HHA on L)  Assistance: Contact guard assistance  Quality of Gait: slow,

## 2025-06-29 NOTE — PROGRESS NOTES
[facility]  Office: [phone]  Curt Venegas, DO, Alberto Frye, DO, William Fiore DO, Lucho Panda, DO, Emily Carbone MD, Briana Velásquez MD, Mejia Trevino MD, Raissa Randolph MD,  Dilan Villasenor MD, Oxana Chau MD, Bertrand Millan MD,  Hank Beauchamp DO, Herminia Garzon MD, Ulisses Hicks MD, Shree Venegas DO, Chichi Lynch MD,  Jaden Bay DO, Erica Delgado MD, Farrah Johnson MD, Jim Randle MD,  Tanner Ivey MD, Eusebio Esparza MD, Geoffrey Jung MD, Amish Lundy MD, Lavell Ferreira MD, Hal Medrano MD, Olaf Regalado, DO, Suzi Waggoner MD, Harris Brush DO, Jewel Edmond MD, Hank Germain MD, Mohsin Reza, MD, Mike Dhaliwal MD, Shirley Waterhouse, CNP,  Becka Brown, CNP, Olaf Ragsdale, CNP,  Chikis Perry, RED, Ximena Phelps, CNP, Katey Woo, CNP, Tamiko Noel, CNP, Trish Goodman, CNP, Theresa Kraus, PA-C, Mikki Joshua, CNP, Vikram Davis, CNP,  Danielle Portillo, CNP, Jane Kaur, CNP, Jb Fairbanks, PA-C, Charity Escobar, PA-C, Katalina Henderson, CNP,        Sherita Amaya, CNS, Hailey Agarwal, CNP, Iram Smith, CNP         [facility]   IN-PATIENT SERVICE   [facility]    Progress Note    6/29/2025    1:07 PM    Name:   Barbie Arevalo  MRN:     [id number]     Acct:      [id number]   Room:   [address] [place] [address] Day:  1  Admit Date:  6/28/2025 11:18 AM    PCP:   Harley Peralta MD  Code Status:  Full Code    Subjective:     C/C:   Chief Complaint   Patient presents with    Abdominal Cramping    Decreased appetite     Patient states on Thursday she started having increased decreased appetite, abd cramping. Patient states she was dx with a UTI put on antibiotics on June 12th, for UTI, went to [facility] ER on Monday, dx with UTI, given Cipro, then abd cramping, decreased appetite, increased weakness, and some \"memory issues\".      Extremity Weakness     Interval History Status: improved.     Seen at bedside, hemodynamically  stable  Denies any nausea, vomiting states that abdominal pain is improving did not have bowel movement yet not passing gas  Labs reviewed  Continues to have NG tube to suction  Creatinine 1.7      Brief History:     84-year-old female with history of essential hypertension, tachycardia, diabetes mellitus, colectomy for colon cancer, status post chemoradiation irritable bowel syndrome, hysterectomy presented with abdominal pain that started Thursday unable to tolerate any diet because she did not have any appetite denied any nausea/vomiting does have a history of irregular bowel movements last bowel movement was Wednesday was recently treated for UTI  Labs showed elevated creatinine mildly elevated troponin  Imaging concerning for high-grade small bowel obstruction    Medications:     Allergies:    Allergies   Allergen Reactions    Bee Venom     Statins Myalgia       Current Meds:   Scheduled Meds:    cefTRIAXone (ROCEPHIN) IV  1,000 mg IntraVENous Q24H    sodium chloride flush  5-40 mL IntraVENous 2 times per day    pantoprazole (PROTONIX) 40 mg in sodium chloride (PF) 0.9 % 10 mL injection  40 mg IntraVENous Daily     Continuous Infusions:    sodium chloride      sodium chloride 75 mL/hr at 25 0624     PRN Meds: sodium chloride flush, sodium chloride, ondansetron **OR** ondansetron, polyethylene glycol, acetaminophen **OR** acetaminophen, metoprolol, labetalol    Data:     Past Medical History:   has a past medical history of Diabetes 1.5, managed as type 2 (HCC) and Hypertension.    Social History:   reports that she has never smoked. She has never used smokeless tobacco. She reports that she does not use drugs.     Family History: No family history on file.    Vitals:  BP (!) 157/57   Pulse 80   Temp 97.5 °F (36.4 °C) (Oral)   Resp 16   Ht 1.549 m (5' 1\")   Wt 69.5 kg (153 lb 3.5 oz)   SpO2 92%   BMI 28.95 kg/m²   Temp (24hrs), Av °F (36.7 °C), Min:97.5 °F (36.4 °C), Max:98.4 °F (36.9 °C)    No

## 2025-06-29 NOTE — PLAN OF CARE
Problem: Chronic Conditions and Co-morbidities  Goal: Patient's chronic conditions and co-morbidity symptoms are monitored and maintained or improved  6/29/2025 0258 by Zollinger, Sheri, RN  Outcome: Progressing  6/28/2025 1639 by Barbara Preston RN  Outcome: Progressing     Problem: Discharge Planning  Goal: Discharge to home or other facility with appropriate resources  6/29/2025 0258 by Zollinger, Sheri, RN  Outcome: Progressing  6/28/2025 1639 by Barbara Preston RN  Outcome: Progressing     Problem: Pain  Goal: Verbalizes/displays adequate comfort level or baseline comfort level  6/29/2025 0258 by Zollinger, Sheri, RN  Outcome: Progressing  6/28/2025 1639 by Barbara Preston RN  Outcome: Progressing     Problem: Safety - Adult  Goal: Free from fall injury  6/29/2025 0258 by Zollinger, Sheri, RN  Outcome: Progressing  6/28/2025 1639 by Barbara Preston RN  Outcome: Progressing     Problem: ABCDS Injury Assessment  Goal: Absence of physical injury  6/29/2025 0258 by Zollinger, Sheri, RN  Outcome: Progressing  6/28/2025 1639 by Barbara Preston RN  Outcome: Progressing

## 2025-06-29 NOTE — PROGRESS NOTES
Occupational Therapy  Occupational Therapy Initial Evaluation  Facility/Department: [address]   Patient Name: Barbie Arevalo        MRN: [id number]    : 1940    Date of Service: 2025    Chief Complaint   Patient presents with    Abdominal Cramping    Decreased appetite     Patient states on Thursday she started having increased decreased appetite, abd cramping. Patient states she was dx with a UTI put on antibiotics on , for UTI, went to [facility] ER on Monday, dx with UTI, given Cipro, then abd cramping, decreased appetite, increased weakness, and some \"memory issues\".      Extremity Weakness     Past Medical History:  has a past medical history of Diabetes 1.5, managed as type 2 (HCC) and Hypertension.  Past Surgical History:  has no past surgical history on file.    Discharge Recommendations  Discharge Recommendations: Patient would benefit from continued therapy after discharge    Assessment  Performance deficits / Impairments: Decreased functional mobility ;Decreased ADL status;Decreased safe awareness;Decreased cognition;Decreased endurance;Decreased balance;Decreased high-level IADLs  Assessment: Pt presents with above noted deficits impacting pt's ADL status requiring CGA for functional transfers/functional mobility this date with use of cane and CGA for engagement in ADL tasks. At baseline, pt performs ADL tasks independently and performs functional transfers/functional mobility with mod IND using cane. Pt to benefit from continued therapy services while hospitalized and at discharge to maximize pt's safety and independence in performing functional tasks.  Prognosis: Good  Decision Making: Medium Complexity  REQUIRES OT FOLLOW-UP: Yes  Activity Tolerance  Activity Tolerance: Patient limited by fatigue  Safety Devices  Type of Devices: Call light within reach;Patient at risk for falls;Nurse notified;Gait belt;Bed alarm in place;Left in bed  Restraints  Restraints Initially  in Place: No    AM-PAC  AM-[facility] Daily Activity - Inpatient   How much help is needed for putting on and taking off regular lower body clothing?: A Little  How much help is needed for bathing (which includes washing, rinsing, drying)?: A Little  How much help is needed for toileting (which includes using toilet, bedpan, or urinal)?: A Little  How much help is needed for putting on and taking off regular upper body clothing?: A Little  How much help is needed for taking care of personal grooming?: A Little  How much help for eating meals?: None  AM-[facility] Inpatient Daily Activity Raw Score: 19  AM-PAC Inpatient ADL T-Scale Score : 40.22  ADL Inpatient CMS 0-100% Score: 42.8  ADL Inpatient CMS G-Code Modifier : CK    Restrictions/Precautions  Restrictions/Precautions  Restrictions/Precautions: Fall Risk  Activity Level: Up as Tolerated  Required Braces or Orthoses?: No  Position Activity Restriction  Other Position/Activity Restrictions: NG tube in place to intermittent suction, ok'd to clamp from suction for session per RN  O2 Device: None (Room air)    Subjective  General  Patient assessed for rehabilitation services?: Yes  Family / Caregiver Present: No  General Comment  Comments: RN ok'd for therapy this PM. Pt agreeable to participate in session and pleasant/cooperative throughout.  Pain  Pre-Pain: 0  Post-Pain: 0      Home Setup/Prior Level of Function  Social/Functional History  Lives With: Alone  Type of Home: House  Home Layout: One level;Laundry in basement  Home Access: Stairs to enter with rails  Entrance Stairs - Number of Steps: 2  Entrance Stairs - Rails: Both  Bathroom Shower/Tub: Tub/Shower unit (uses tub only)  Bathroom Toilet: Handicap height  Bathroom Equipment: Grab bars in shower;Hand-held shower  Bathroom Accessibility: Walker accessible  Home Equipment: Cane  Has the patient had two or more falls in the past year or any fall with injury in the past year?: No  Prior Level of Assist for ADLs:

## 2025-06-30 LAB
ANION GAP SERPL CALCULATED.3IONS-SCNC: 11 MMOL/L (ref 9–16)
BASOPHILS # BLD: 0 K/UL (ref 0–0.2)
BASOPHILS NFR BLD: 0 % (ref 0–2)
BUN SERPL-MCNC: 30 MG/DL (ref 8–23)
CALCIUM SERPL-MCNC: 8.7 MG/DL (ref 8.6–10.4)
CHLORIDE SERPL-SCNC: 107 MMOL/L (ref 98–107)
CO2 SERPL-SCNC: 24 MMOL/L (ref 20–31)
CREAT SERPL-MCNC: 1.3 MG/DL (ref 0.6–0.9)
EOSINOPHIL # BLD: 0.1 K/UL (ref 0–0.4)
EOSINOPHILS RELATIVE PERCENT: 2 % (ref 1–4)
ERYTHROCYTE [DISTWIDTH] IN BLOOD BY AUTOMATED COUNT: 15.3 % (ref 12.5–15.4)
GFR, ESTIMATED: 40 ML/MIN/1.73M2
GLUCOSE BLD-MCNC: 136 MG/DL (ref 65–105)
GLUCOSE BLD-MCNC: 143 MG/DL (ref 65–105)
GLUCOSE BLD-MCNC: 152 MG/DL (ref 65–105)
GLUCOSE BLD-MCNC: 158 MG/DL (ref 65–105)
GLUCOSE SERPL-MCNC: 152 MG/DL (ref 74–99)
HCT VFR BLD AUTO: 34.9 % (ref 36–46)
HGB BLD-MCNC: 11.6 G/DL (ref 12–16)
LYMPHOCYTES NFR BLD: 0.8 K/UL (ref 1–4.8)
LYMPHOCYTES RELATIVE PERCENT: 11 % (ref 24–44)
MCH RBC QN AUTO: 27.5 PG (ref 26–34)
MCHC RBC AUTO-ENTMCNC: 33.3 G/DL (ref 31–37)
MCV RBC AUTO: 82.4 FL (ref 80–100)
MICROORGANISM SPEC CULT: NO GROWTH
MONOCYTES NFR BLD: 0.7 K/UL (ref 0.1–1.2)
MONOCYTES NFR BLD: 11 % (ref 2–11)
NEUTROPHILS NFR BLD: 76 % (ref 36–66)
NEUTS SEG NFR BLD: 5.2 K/UL (ref 1.8–7.7)
PLATELET # BLD AUTO: 200 K/UL (ref 140–450)
PMV BLD AUTO: 7.4 FL (ref 6–12)
POTASSIUM SERPL-SCNC: 3.8 MMOL/L (ref 3.7–5.3)
RBC # BLD AUTO: 4.23 M/UL (ref 4–5.2)
SODIUM SERPL-SCNC: 142 MMOL/L (ref 136–145)
SPECIMEN DESCRIPTION: NORMAL
WBC OTHER # BLD: 6.8 K/UL (ref 3.5–11)

## 2025-06-30 PROCEDURE — 6370000000 HC RX 637 (ALT 250 FOR IP): Performed by: STUDENT IN AN ORGANIZED HEALTH CARE EDUCATION/TRAINING PROGRAM

## 2025-06-30 PROCEDURE — 51798 US URINE CAPACITY MEASURE: CPT

## 2025-06-30 PROCEDURE — 85025 COMPLETE CBC W/AUTO DIFF WBC: CPT

## 2025-06-30 PROCEDURE — 97116 GAIT TRAINING THERAPY: CPT

## 2025-06-30 PROCEDURE — 80048 BASIC METABOLIC PNL TOTAL CA: CPT

## 2025-06-30 PROCEDURE — 6360000002 HC RX W HCPCS: Performed by: STUDENT IN AN ORGANIZED HEALTH CARE EDUCATION/TRAINING PROGRAM

## 2025-06-30 PROCEDURE — 1200000000 HC SEMI PRIVATE

## 2025-06-30 PROCEDURE — 6370000000 HC RX 637 (ALT 250 FOR IP): Performed by: NURSE PRACTITIONER

## 2025-06-30 PROCEDURE — 99232 SBSQ HOSP IP/OBS MODERATE 35: CPT | Performed by: STUDENT IN AN ORGANIZED HEALTH CARE EDUCATION/TRAINING PROGRAM

## 2025-06-30 PROCEDURE — 2500000003 HC RX 250 WO HCPCS: Performed by: STUDENT IN AN ORGANIZED HEALTH CARE EDUCATION/TRAINING PROGRAM

## 2025-06-30 PROCEDURE — 2580000003 HC RX 258

## 2025-06-30 PROCEDURE — 82947 ASSAY GLUCOSE BLOOD QUANT: CPT

## 2025-06-30 PROCEDURE — 36415 COLL VENOUS BLD VENIPUNCTURE: CPT

## 2025-06-30 RX ORDER — DIPHENHYDRAMINE HCL 25 MG
25 TABLET ORAL NIGHTLY PRN
Status: DISCONTINUED | OUTPATIENT
Start: 2025-06-30 | End: 2025-07-02

## 2025-06-30 RX ADMIN — DIPHENHYDRAMINE HYDROCHLORIDE 25 MG: 25 TABLET ORAL at 22:30

## 2025-06-30 RX ADMIN — SODIUM CHLORIDE, PRESERVATIVE FREE 40 MG: 5 INJECTION INTRAVENOUS at 11:30

## 2025-06-30 RX ADMIN — SODIUM CHLORIDE: 0.9 INJECTION, SOLUTION INTRAVENOUS at 15:22

## 2025-06-30 RX ADMIN — WATER 1000 MG: 1 INJECTION INTRAMUSCULAR; INTRAVENOUS; SUBCUTANEOUS at 11:27

## 2025-06-30 RX ADMIN — ACETAMINOPHEN 650 MG: 325 TABLET ORAL at 17:06

## 2025-06-30 ASSESSMENT — PAIN DESCRIPTION - LOCATION: LOCATION: BACK

## 2025-06-30 NOTE — PROGRESS NOTES
Spiritual Health History and Assessment/Progress Note  [facility]    (P) Spiritual/Emotional Needs,  , (P) Life Adjustments, Adjustment to illness,      Name: Barbie Arevalo MRN: [id number]    Age: 84 y.o.     Sex: female   Language: English   Protestant: Other   SBO (small bowel obstruction) (HCC)     Date: 6/29/2025            Total Time Calculated: (P) 15 min              Spiritual Assessment began in [facility] [place]        Referral/Consult From: (P) Rounding   Encounter Overview/Reason: (P) Spiritual/Emotional Needs  Service Provided For: (P) Patient       introduced self and pastoral role. Pt. Was open to conversation and welcoming. provided support and empathic listening. Pt. Was interested in discussion about health, annika and family.  offered encouragement and prayer for comfort, strength and hope.    Annika, Belief, Meaning:   Patient has beliefs or practices that help with coping during difficult times  Family/Friends No family/friends present      Importance and Influence:  Patient has spiritual/personal beliefs that influence decisions regarding their health  Family/Friends No family/friends present    Community:  Patient feels well-supported. Support system includes: Children  Family/Friends No family/friends present    Assessment and Plan of Care:     Patient Interventions include: Facilitated expression of thoughts and feelings and Explored spiritual coping/struggle/distress  Family/Friends Interventions include: No family/friends present    Patient Plan of Care: Spiritual Care available upon further referral  Family/Friends Plan of Care: No family/friends present    Electronically signed by Chaplain TREVOR on 6/29/2025 at 8:27 PM    06/29/25 2025   Encounter Summary   Encounter Overview/Reason Spiritual/Emotional Needs   Service Provided For Patient   Referral/Consult From [facility]   Support System Children   Last Encounter  06/29/25   Complexity of

## 2025-06-30 NOTE — PROGRESS NOTES
PATIENT NAME: Barbie Arevalo     TODAY'S DATE: 6/30/2025, 11:04 AM    SUBJECTIVE:    85 y/o female seen and examined. States she passed gas.no nausea. 150 cc ngt output     OBJECTIVE:   VITALS:  BP (!) 146/62   Pulse 67   Temp 99 °F (37.2 °C) (Axillary)   Resp 17   Ht 1.549 m (5' 0.98\")   Wt 69.5 kg (153 lb 3.5 oz)   SpO2 99%   BMI 28.97 kg/m²      INTAKE/OUTPUT:      Intake/Output Summary (Last 24 hours) at 6/30/2025 1104  Last data filed at 6/30/2025 0815  Gross per 24 hour   Intake --   Output 1100 ml   Net -1100 ml                 CONSTITUTIONAL:  awake and alert.  No acute distress.  Patient is good spirits.  Patient is not toxic or ill appearing.  ABDOMEN:   Abdomen soft and nontender. Patient has improved abdominal distention compared to yesterday.    Skin: warm well perfused  CV: RRR  Pulm: normal work of breathing on room air    Data:  CBC:   Lab Results   Component Value Date/Time    WBC 6.8 06/30/2025 06:23 AM    RBC 4.23 06/30/2025 06:23 AM    HGB 11.6 06/30/2025 06:23 AM    HCT 34.9 06/30/2025 06:23 AM    MCV 82.4 06/30/2025 06:23 AM    MCH 27.5 06/30/2025 06:23 AM    MCHC 33.3 06/30/2025 06:23 AM    RDW 15.3 06/30/2025 06:23 AM     06/30/2025 06:23 AM    MPV 7.4 06/30/2025 06:23 AM     Component  Ref Range & Units (hover) 6/29/25 0703 6/28/25 2117   Lactic Acid 0.9 1.2       ASSESSMENT   Small bowel obstruction  Hx colectomy  Hx DM     Plan  Please clamp ngt  Ok for sips of water with meds and few ice chips  Strict record intake output  Will go slow    Electronically signed by Sharifa Esquivel DO  on 6/30/2025 at 11:04 AM      Attending Physician Statement  I have discussed the case with Dr Esquivel, including pertinent history and exam findings with the resident. I have seen and examined the patient and the key elements of the encounter have been performed by me.  I agree with the assessment, plan and orders as documented by the resident.      Patient is passing gas regularly  and had 2 bowel movements.  Patient's abdomen is soft and nontender.  Denies nausea.  Will clamp tube and give ice chips.  If patient continues to have bowel function tomorrow, will trial clears with ng tube clamped.      Electronically signed by Kyle Humphreys IV, DO  on 6/30/2025 at 7:20 PM

## 2025-06-30 NOTE — PROGRESS NOTES
Comprehensive Nutrition Assessment    Type and Reason for Visit:  Initial, Positive nutrition screen    Nutrition Recommendations/Plan:   Monitor for diet advancement      Malnutrition Assessment:  Malnutrition Status:  At risk for malnutrition (06/30/25 0833)    Context:  Acute Illness     Findings of the 6 clinical characteristics of malnutrition:  Energy Intake:  Mild decrease in energy intake  Weight Loss:  Mild weight loss     Body Fat Loss:  No body fat loss     Muscle Mass Loss:  No muscle mass loss    Fluid Accumulation:  No fluid accumulation     Strength:  Not Performed    Nutrition Assessment:    Patient is a 84 y.o. female who presents with abdominal cramping, decreased appetite (Patient states on Thursday she started having increased decreased appetite, abd cramping. Patient stated she was dx with a UTI put on antibiotics on June 12th), and extremity Weakness and is admitted to the hospital for the management of SBO.  PNS for decreased appetite/reported weight loss. EMR chart review shows possible loss of approx. 7 lbs x 60 days; limited hx r/t lack of accurate weights. No significant/severe weight loss noted. NG for suction. Currently NPO. Last BM noted today. Consitpation 6/29. Will monitor for diet advancement.    Nutrition Related Findings:    Hypoactive bowel sounds. Last BM 6/30. No edema. Labs and meds reviewed: BUN 30, creatinine 1.3, glucose 152 Wound Type: None       Current Nutrition Intake & Therapies:    Average Meal Intake: NPO     Diet NPO Exceptions are: Sips of Water with Meds, Ice Chips    Anthropometric Measures:  Height: 154.9 cm (5' 0.98\")  Ideal Body Weight (IBW): 105 lbs (48 kg)       Current Body Weight: 69.5 kg (153 lb 3.5 oz), 145.9 % IBW. Weight Source: Bed scale  Current BMI (kg/m2): 29           Weight Adjustment For: No Adjustment                 BMI Categories: Overweight (BMI 25.0-29.9)    Estimated Daily Nutrient Needs:  Energy Requirements Based On: Kcal/kg  Weight

## 2025-06-30 NOTE — CARE COORDINATION
Post Acute Facility/Agency List     Provided patient with the following list, the list includes the overall star ratings obtained from CMS per the Medicare Web site (www.Medicare.gov):     [] Long Term Acute Care Facilities  [] Acute Inpatient Rehabilitation Facilities  [] Skilled Nursing Facilities  [] Hospice Facilities  [x] Home Care    Provided verbal instructions on how to utilize the QR Code to obtain additional detailed star ratings from www.Medicare.gov     offered to print and provide the detailed list:    []Accepted   [x]Declined    Patient requesting a referral to [facility] for home care, Nursing/PT/OT with possible aide service if they can also provide that, she is willing to pay OOP if necessary for the aide service.  Referral sent.    [facility] unable to take patient's insurance.  Referral sent to [facility].

## 2025-06-30 NOTE — PROGRESS NOTES
[facility]  Office: [phone]  Curt Venegas, DO, Alberto Frye, DO, William Fiore DO, Lucho Panda, DO, Emily Carbone MD, Briana Velásquez MD, Mejia Trevino MD, Raissa Randolph MD,  Dilan Villasenor MD, Oxana Chau MD, Bertrand Millan MD,  Hank Beauchamp DO, Herminia Garzon MD, Ulisses Hicks MD, Shree Venegas DO, Chichi Lynch MD,  Jaden Bay DO, Erica Delgado MD, Farrah Johnson MD, Jim Randle MD,  Tanner Ivey MD, Eusebio Esparza MD, Geoffrey Jung MD, Amish Lundy MD, Lavell Ferreira MD, Hal Medrano MD, Olaf Regalado, DO, Suzi Waggoner MD, Harris Brush DO, Jewel Edmond MD, Hank Germain MD, Mohsin Reza, MD, Mike Dhaliwal MD, Shirley Waterhouse, CNP,  Becka Brown, CNP, Olaf Ragsdale, CNP,  Chikis Perry, RED, Ximena Phelps, CNP, Katey Woo, CNP, Tamiko Noel, CNP, Trish Goodman, CNP, Theresa Kraus, PA-C, Mikki Joshua, CNP, Vikram Davis, CNP,  Danielle Portillo, CNP, Jane Kaur, CNP, Jb Fairbanks, PA-C, Charity Escobar, PA-C, Katalina Henderson, CNP,        Sherita Amaya, CNS, Hailey Agarwal, CNP, Iram Smith, CNP         [facility]   IN-PATIENT SERVICE   [facility]    Progress Note    6/30/2025    6:17 PM    Name:   Barbie Arevalo  MRN:     [id number]     Acct:      [id number]   Room:   [address] [place] [address] Day:  2  Admit Date:  6/28/2025 11:18 AM    PCP:   Harley Peralta MD  Code Status:  Full Code    Subjective:     C/C:   Chief Complaint   Patient presents with    Abdominal Cramping    Decreased appetite     Patient states on Thursday she started having increased decreased appetite, abd cramping. Patient states she was dx with a UTI put on antibiotics on June 12th, for UTI, went to [facility] ER on Monday, dx with UTI, given Cipro, then abd cramping, decreased appetite, increased weakness, and some \"memory issues\".      Extremity Weakness     Interval History Status: improved.     Seen at bedside, hemodynamically  06/30/25  1126 06/30/25  1632   POCGLU 140* 158* 152* 143*       I/O (24Hr):    Intake/Output Summary (Last 24 hours) at 6/30/2025 1817  Last data filed at 6/30/2025 1530  Gross per 24 hour   Intake --   Output 700 ml   Net -700 ml       Labs:  Hematology:  Recent Labs     06/28/25  1235 06/29/25  0703 06/30/25  0623   WBC 10.3 8.2 6.8   RBC 5.26* 4.60 4.23   HGB 14.3 12.6 11.6*   HCT 42.8 37.5 34.9*   MCV 81.4 81.7 82.4   MCH 27.3 27.4 27.5   MCHC 33.5 33.5 33.3   RDW 15.8* 15.3 15.3    230 200   MPV 7.9 7.1 7.4     Chemistry:  Recent Labs     06/28/25  1235 06/29/25  0703 06/29/25  1724 06/30/25  0623    138 139 142   K 4.4 4.0 4.2 3.8   CL 98 103 104 107   CO2 24 23 21 24   GLUCOSE 176* 163* 158* 152*   BUN 27* 33* 33* 30*   CREATININE 1.6* 1.7* 1.5* 1.3*   ANIONGAP 16 12 14 11   LABGLOM 31* 29* 34* 40*   CALCIUM 10.0 9.1 9.1 8.7   TROPHS 29* 30*  --   --      Recent Labs     06/28/25  1235 06/29/25  2216 06/30/25  0609 06/30/25  1126 06/30/25  1632   AST 28  --   --   --   --    ALT 17  --   --   --   --    ALKPHOS 55  --   --   --   --    BILITOT 0.5  --   --   --   --    BILIDIR 0.1  --   --   --   --    LIPASE 11*  --   --   --   --    POCGLU  --  140* 158* 152* 143*     ABG:No results found for: \"POCPH\", \"PHART\", \"PH\", \"POCPCO2\", \"NLF1MUM\", \"PCO2\", \"POCPO2\", \"PO2ART\", \"PO2\", \"POCHCO3\", \"SMM1BDL\", \"HCO3\", \"NBEA\", \"PBEA\", \"BEART\", \"BE\", \"THGBART\", \"THB\", \"VYF2PTW\", \"ILKS0WXA\", \"U1JAERNN\", \"O2SAT\", \"FIO2\"  No results found for: \"SPECIAL\"  Lab Results   Component Value Date/Time    CULTURE NO GROWTH 06/29/2025 05:38 AM       Radiology:  XR ABDOMEN (KUB) (SINGLE AP VIEW)  Result Date: 6/28/2025  1. NG tube in satisfactory position. 2. Dilated small bowel loops in the mid abdomen suggests small bowel obstruction.     CT ABDOMEN PELVIS WO CONTRAST Additional Contrast? None  Result Date: 6/28/2025  High-grade small bowel obstruction with transition point seen in the pelvis likely related to adhesions.

## 2025-06-30 NOTE — PROGRESS NOTES
Physical Therapy  Facility/Department: [address]   Physical Therapy Daily Treatment Note    Patient Name: Barbie Arevalo        MRN: [id number]    : 1940    Date of Service: 2025    Chief Complaint   Patient presents with    Abdominal Cramping    Decreased appetite     Patient states on Thursday she started having increased decreased appetite, abd cramping. Patient states she was dx with a UTI put on antibiotics on , for UTI[facility] dx with [facility], given Cipro, then abd cramping, decreased appetite, increased weakness, and some \"memory issues\".      Extremity Weakness     Past Medical History:  has a past medical history of Diabetes 1.5, managed as type 2 (HCC) and Hypertension.  Past Surgical History:  has no past surgical history on file.    Discharge Recommendations  Discharge Recommendations: No therapy recommended at discharge  PT Equipment Recommendations  Equipment Needed: No  Other: has cane if needed    Assessment     Assessment: The patient was admitted due to SBO with a hx of colectomy 21 years ago. At baseline, the patient lives alone and requires no assistance with ADLs and functional mobility. The patient resides in a 1 story home with laundry in the basement and 2 steps to enter. The patient ambulates with cane when in community and no device in her house at baseline. The patient currently has NG to intermittent suction and it was OK to clamp for PT session. During session, the patient was able to perform bed mobility with SBA with R bedrail for OOB, transfers with CGA and cane on R, and ambulate 130ft with CGA and cane on R. C/O back pain 8/10 this visit. Pt required encouragement for out ot bed and up to chair. The patient would benefit from acute PT to improve functional independence back to baseline. The patient would be appropriate to return to prior living arrangements with prior assistance as needed. She reports having good family support  activities, Balance training, Home exercise program, Safety education & training, Gait training, Stair training    Goals  Short Term Goals  Time Frame for Short Term Goals: 14 visits  Short Term Goal 1: The patient will be able to negotiate 2 stairs modified independently.  Short Term Goal 2: The patient will be able to perform all bed mobility independently.  Short Term Goal 3: The patient will be able to perform transfers modified independently.  Short Term Goal 4: The patient will be able to ambulate 300ft modified independently.    Minutes  PT Individual Minutes  Time In: 1629  Time Out: 1649  Minutes: 20  Time Code Minutes  Timed Code Treatment Minutes: 20 Minutes    Electronically signed by Niesha Loo PT on 6/30/25 at 4:59 PM EDT

## 2025-07-01 LAB
ANION GAP SERPL CALCULATED.3IONS-SCNC: 12 MMOL/L (ref 9–16)
BASOPHILS # BLD: 0 K/UL (ref 0–0.2)
BASOPHILS NFR BLD: 1 % (ref 0–2)
BUN SERPL-MCNC: 24 MG/DL (ref 8–23)
CALCIUM SERPL-MCNC: 8.4 MG/DL (ref 8.6–10.4)
CHLORIDE SERPL-SCNC: 108 MMOL/L (ref 98–107)
CO2 SERPL-SCNC: 22 MMOL/L (ref 20–31)
CREAT SERPL-MCNC: 1 MG/DL (ref 0.6–0.9)
EOSINOPHIL # BLD: 0.1 K/UL (ref 0–0.4)
EOSINOPHILS RELATIVE PERCENT: 2 % (ref 1–4)
ERYTHROCYTE [DISTWIDTH] IN BLOOD BY AUTOMATED COUNT: 15.6 % (ref 12.5–15.4)
GFR, ESTIMATED: 55 ML/MIN/1.73M2
GLUCOSE BLD-MCNC: 112 MG/DL (ref 65–105)
GLUCOSE BLD-MCNC: 127 MG/DL (ref 65–105)
GLUCOSE BLD-MCNC: 145 MG/DL (ref 65–105)
GLUCOSE BLD-MCNC: 209 MG/DL (ref 65–105)
GLUCOSE SERPL-MCNC: 132 MG/DL (ref 74–99)
HCT VFR BLD AUTO: 32.8 % (ref 36–46)
HGB BLD-MCNC: 10.9 G/DL (ref 12–16)
LYMPHOCYTES NFR BLD: 0.8 K/UL (ref 1–4.8)
LYMPHOCYTES RELATIVE PERCENT: 11 % (ref 24–44)
MCH RBC QN AUTO: 27.4 PG (ref 26–34)
MCHC RBC AUTO-ENTMCNC: 33.3 G/DL (ref 31–37)
MCV RBC AUTO: 82.2 FL (ref 80–100)
MONOCYTES NFR BLD: 0.8 K/UL (ref 0.1–1.2)
MONOCYTES NFR BLD: 11 % (ref 2–11)
NEUTROPHILS NFR BLD: 75 % (ref 36–66)
NEUTS SEG NFR BLD: 5.5 K/UL (ref 1.8–7.7)
PLATELET # BLD AUTO: 199 K/UL (ref 140–450)
PMV BLD AUTO: 7.4 FL (ref 6–12)
POTASSIUM SERPL-SCNC: 3.6 MMOL/L (ref 3.7–5.3)
RBC # BLD AUTO: 3.99 M/UL (ref 4–5.2)
SODIUM SERPL-SCNC: 142 MMOL/L (ref 136–145)
WBC OTHER # BLD: 7.2 K/UL (ref 3.5–11)

## 2025-07-01 PROCEDURE — 6360000002 HC RX W HCPCS: Performed by: STUDENT IN AN ORGANIZED HEALTH CARE EDUCATION/TRAINING PROGRAM

## 2025-07-01 PROCEDURE — 80048 BASIC METABOLIC PNL TOTAL CA: CPT

## 2025-07-01 PROCEDURE — 36415 COLL VENOUS BLD VENIPUNCTURE: CPT

## 2025-07-01 PROCEDURE — 2580000003 HC RX 258

## 2025-07-01 PROCEDURE — 85025 COMPLETE CBC W/AUTO DIFF WBC: CPT

## 2025-07-01 PROCEDURE — 2500000003 HC RX 250 WO HCPCS: Performed by: STUDENT IN AN ORGANIZED HEALTH CARE EDUCATION/TRAINING PROGRAM

## 2025-07-01 PROCEDURE — 99232 SBSQ HOSP IP/OBS MODERATE 35: CPT | Performed by: INTERNAL MEDICINE

## 2025-07-01 PROCEDURE — 6370000000 HC RX 637 (ALT 250 FOR IP)

## 2025-07-01 PROCEDURE — 82947 ASSAY GLUCOSE BLOOD QUANT: CPT

## 2025-07-01 PROCEDURE — 6370000000 HC RX 637 (ALT 250 FOR IP): Performed by: INTERNAL MEDICINE

## 2025-07-01 PROCEDURE — 1200000000 HC SEMI PRIVATE

## 2025-07-01 PROCEDURE — 6360000002 HC RX W HCPCS: Performed by: NURSE PRACTITIONER

## 2025-07-01 RX ORDER — GLUCAGON 1 MG/ML
1 KIT INJECTION PRN
Status: DISCONTINUED | OUTPATIENT
Start: 2025-07-01 | End: 2025-07-07 | Stop reason: HOSPADM

## 2025-07-01 RX ORDER — INSULIN LISPRO 100 [IU]/ML
0-4 INJECTION, SOLUTION INTRAVENOUS; SUBCUTANEOUS
Status: DISCONTINUED | OUTPATIENT
Start: 2025-07-01 | End: 2025-07-07 | Stop reason: HOSPADM

## 2025-07-01 RX ORDER — DEXTROSE MONOHYDRATE 100 MG/ML
INJECTION, SOLUTION INTRAVENOUS CONTINUOUS PRN
Status: DISCONTINUED | OUTPATIENT
Start: 2025-07-01 | End: 2025-07-07 | Stop reason: HOSPADM

## 2025-07-01 RX ORDER — DIPHENHYDRAMINE HYDROCHLORIDE 50 MG/ML
25 INJECTION, SOLUTION INTRAMUSCULAR; INTRAVENOUS ONCE
Status: COMPLETED | OUTPATIENT
Start: 2025-07-01 | End: 2025-07-01

## 2025-07-01 RX ADMIN — SODIUM CHLORIDE, PRESERVATIVE FREE 40 MG: 5 INJECTION INTRAVENOUS at 11:17

## 2025-07-01 RX ADMIN — DIPHENHYDRAMINE HYDROCHLORIDE 25 MG: 50 INJECTION INTRAMUSCULAR; INTRAVENOUS at 22:16

## 2025-07-01 RX ADMIN — SODIUM CHLORIDE, PRESERVATIVE FREE 10 ML: 5 INJECTION INTRAVENOUS at 11:32

## 2025-07-01 RX ADMIN — LABETALOL HYDROCHLORIDE 10 MG: 5 INJECTION, SOLUTION INTRAVENOUS at 22:17

## 2025-07-01 RX ADMIN — INSULIN LISPRO 1 UNITS: 100 INJECTION, SOLUTION INTRAVENOUS; SUBCUTANEOUS at 12:44

## 2025-07-01 RX ADMIN — POTASSIUM BICARBONATE 40 MEQ: 782 TABLET, EFFERVESCENT ORAL at 11:17

## 2025-07-01 RX ADMIN — LABETALOL HYDROCHLORIDE 10 MG: 5 INJECTION, SOLUTION INTRAVENOUS at 05:29

## 2025-07-01 RX ADMIN — SODIUM CHLORIDE: 0.9 INJECTION, SOLUTION INTRAVENOUS at 02:32

## 2025-07-01 NOTE — PROGRESS NOTES
PATIENT NAME: Barbie Arevalo     TODAY'S DATE: 7/1/2025, 8:44 AM    SUBJECTIVE:    Patient is seen and examined this morning, no acute event overnight.  Patient has been clamped for the NG tube, denies of nausea or vomiting this morning.  Patient continues to pass small amount of gas and small amount of stool.  Patient stated she is feeling hungry this morning, and denies of abdominal pain.       OBJECTIVE:   VITALS:  BP (!) 127/56   Pulse 70   Temp 98.4 °F (36.9 °C) (Oral)   Resp 16   Ht 1.549 m (5' 0.98\")   Wt 70 kg (154 lb 5.2 oz)   SpO2 93%   BMI 29.17 kg/m²      INTAKE/OUTPUT:      Intake/Output Summary (Last 24 hours) at 7/1/2025 0844  Last data filed at 7/1/2025 0700  Gross per 24 hour   Intake 2255.75 ml   Output 1250 ml   Net 1005.75 ml                 CONSTITUTIONAL:  awake and alert.  No acute distress.  Patient is good spirits.  Patient is not toxic or ill appearing.  ABDOMEN:   Abdomen soft and nontender. Patient has improved abdominal distention compared to yesterday.    Skin: warm well perfused  CV: RRR  Pulm: normal work of breathing on room air    Data:  CBC:   Lab Results   Component Value Date/Time    WBC 7.2 07/01/2025 05:46 AM    RBC 3.99 07/01/2025 05:46 AM    HGB 10.9 07/01/2025 05:46 AM    HCT 32.8 07/01/2025 05:46 AM    MCV 82.2 07/01/2025 05:46 AM    MCH 27.4 07/01/2025 05:46 AM    MCHC 33.3 07/01/2025 05:46 AM    RDW 15.6 07/01/2025 05:46 AM     07/01/2025 05:46 AM    MPV 7.4 07/01/2025 05:46 AM     Component  Ref Range & Units (hover) 6/29/25 0703 6/28/25 2117   Lactic Acid 0.9 1.2       ASSESSMENT   Small bowel obstruction  Hx colectomy  Hx DM     Plan  Advance diet to clear for diet, and no advancement today.    Continue NG tube clamped, if patient becomes nausea and vomiting, please unclamp the NG tube and put back to suction.  If patient continues to tolerate clear liquid diet while NG tube clamped, patient can have her NG tube removed by this

## 2025-07-01 NOTE — PROGRESS NOTES
[facility]  Office: [phone]  Curt Venegas, DO, Alberto Frye, DO, William Fiore DO, Lucho Panda, DO, Emily Carbone MD, Briana Velásquez MD, Mejia Trevino MD, Raissa Randolph MD,  Dilan Villasenor MD, Oxana Chau MD, Bertrand Millan MD,  Hank Beauchamp DO, Herminia Garzon MD, Ulisses Hicks MD, Shree Venegas DO, Chichi Lynch MD,  Jaden Bay DO, Erica Delgado MD, Farrah Johnson MD, Jim Randle MD,  Tanner Ivey MD, Eusebio Esparza MD, Geoffrey Jung MD, Amish Lundy MD, Lavell Ferreira MD, Hal Medrano MD, Olaf Regalado, DO, Suzi Waggoner MD, Harris Brush DO, Jewel Edmond MD, Hank Germain MD, Mohsin Reza, MD, Mike Dhaliwal MD, Shirley Waterhouse, CNP,  Becka Brown, CNP, Olaf Ragsdale, CNP,  Chikis Perry, RED, Ximena Phelps, CNP, Katey Woo, CNP, Tamiko Noel, CNP, Trish oGodman, CNP, Theresa Kraus, PA-C, Mikki Joshua, CNP, Vikram Davis, CNP,  Danielle Portillo, CNP, Jane Kaur, CNP, Jb Fairbanks, PA-C, Charity Escobar, PA-C, Katalina Henderson, CNP,        Sherita Amaya, CNS, Hailey Agarwal, CNP, Iram Smith, CNP         [facility]   IN-PATIENT SERVICE   [facility]    Progress Note    7/1/2025    7:24 AM    Name:   Barbie Arevalo  MRN:     [id number]     Acct:      [id number]   Room:   [address] [place] [address] Day:  3  Admit Date:  6/28/2025 11:18 AM    PCP:   Harley Peralta MD  Code Status:  Full Code    Subjective:     C/C:   Chief Complaint   Patient presents with    Abdominal Cramping    Decreased appetite     Patient states on Thursday she started having increased decreased appetite, abd cramping. Patient states she was dx with a UTI put on antibiotics on June 12th, for UTI, went to [facility] ER on Monday, dx with UTI, given Cipro, then abd cramping, decreased appetite, increased weakness, and some \"memory issues\".      Extremity Weakness     Interval History Status: improved.     Patient seen and examined reports

## 2025-07-01 NOTE — PLAN OF CARE
Problem: Chronic Conditions and Co-morbidities  Goal: Patient's chronic conditions and co-morbidity symptoms are monitored and maintained or improved  Outcome: Progressing  Flowsheets (Taken 6/30/2025 2000)  Care Plan - Patient's Chronic Conditions and Co-Morbidity Symptoms are Monitored and Maintained or Improved:   Monitor and assess patient's chronic conditions and comorbid symptoms for stability, deterioration, or improvement   Collaborate with multidisciplinary team to address chronic and comorbid conditions and prevent exacerbation or deterioration   Update acute care plan with appropriate goals if chronic or comorbid symptoms are exacerbated and prevent overall improvement and discharge     Problem: Discharge Planning  Goal: Discharge to home or other facility with appropriate resources  Outcome: Progressing  Flowsheets (Taken 6/30/2025 2000)  Discharge to home or other facility with appropriate resources:   Identify barriers to discharge with patient and caregiver   Arrange for needed discharge resources and transportation as appropriate   Identify discharge learning needs (meds, wound care, etc)   Refer to discharge planning if patient needs post-hospital services based on physician order or complex needs related to functional status, cognitive ability or social support system     Problem: Pain  Goal: Verbalizes/displays adequate comfort level or baseline comfort level  Outcome: Progressing     Problem: Safety - Adult  Goal: Free from fall injury  Outcome: Progressing     Problem: ABCDS Injury Assessment  Goal: Absence of physical injury  Outcome: Progressing     Problem: Nutrition Deficit:  Goal: Optimize nutritional status  Outcome: Progressing

## 2025-07-01 NOTE — PLAN OF CARE
Problem: Chronic Conditions and Co-morbidities  Goal: Patient's chronic conditions and co-morbidity symptoms are monitored and maintained or improved  7/1/2025 1926 by Layne Godfrey LPN  Outcome: Progressing  Flowsheets (Taken 7/1/2025 0916)  Care Plan - Patient's Chronic Conditions and Co-Morbidity Symptoms are Monitored and Maintained or Improved: Monitor and assess patient's chronic conditions and comorbid symptoms for stability, deterioration, or improvement  7/1/2025 0621 by Gaviota Loaiza, RN  Outcome: Progressing  Flowsheets (Taken 6/30/2025 2000)  Care Plan - Patient's Chronic Conditions and Co-Morbidity Symptoms are Monitored and Maintained or Improved:   Monitor and assess patient's chronic conditions and comorbid symptoms for stability, deterioration, or improvement   Collaborate with multidisciplinary team to address chronic and comorbid conditions and prevent exacerbation or deterioration   Update acute care plan with appropriate goals if chronic or comorbid symptoms are exacerbated and prevent overall improvement and discharge     Problem: Discharge Planning  Goal: Discharge to home or other facility with appropriate resources  Recent Flowsheet Documentation  Taken 7/1/2025 0916 by Layne Godfrey LPN  Discharge to home or other facility with appropriate resources: Identify barriers to discharge with patient and caregiver  7/1/2025 0621 by Gaviota Loaiza, RN  Outcome: Progressing  Flowsheets (Taken 6/30/2025 2000)  Discharge to home or other facility with appropriate resources:   Identify barriers to discharge with patient and caregiver   Arrange for needed discharge resources and transportation as appropriate   Identify discharge learning needs (meds, wound care, etc)   Refer to discharge planning if patient needs post-hospital services based on physician order or complex needs related to functional status, cognitive ability or social support system     Problem: Pain  Goal:

## 2025-07-01 NOTE — PROGRESS NOTES
Occupational Therapy    [facility]  Occupational Therapy Not Seen Note    DATE: 2025    NAME: Barbie Arevalo  MRN: [id number]   : 1940      Patient not seen this date for Occupational Therapy due to:    Patient Declined: Pt politely declined this date stating she does not feel well, and has already been up today. Pt educated on benefit of OOB activity with Pt verbalizing understanding but continued to politely decline, agreeable to tomorrow check back.     Next Scheduled Treatment: 25    Electronically signed by ROSA AG on 2025 at 4:08 PM

## 2025-07-02 ENCOUNTER — APPOINTMENT (OUTPATIENT)
Dept: GENERAL RADIOLOGY | Age: 85
DRG: 390 | End: 2025-07-02
Payer: MEDICARE

## 2025-07-02 LAB
ANION GAP SERPL CALCULATED.3IONS-SCNC: 12 MMOL/L (ref 9–16)
BASOPHILS # BLD: 0 K/UL (ref 0–0.2)
BASOPHILS NFR BLD: 1 % (ref 0–2)
BUN SERPL-MCNC: 16 MG/DL (ref 8–23)
CALCIUM SERPL-MCNC: 8.5 MG/DL (ref 8.6–10.4)
CHLORIDE SERPL-SCNC: 107 MMOL/L (ref 98–107)
CO2 SERPL-SCNC: 22 MMOL/L (ref 20–31)
CREAT SERPL-MCNC: 0.9 MG/DL (ref 0.6–0.9)
EOSINOPHIL # BLD: 0.1 K/UL (ref 0–0.4)
EOSINOPHILS RELATIVE PERCENT: 1 % (ref 1–4)
ERYTHROCYTE [DISTWIDTH] IN BLOOD BY AUTOMATED COUNT: 15.2 % (ref 12.5–15.4)
GFR, ESTIMATED: 63 ML/MIN/1.73M2
GLUCOSE BLD-MCNC: 163 MG/DL (ref 65–105)
GLUCOSE BLD-MCNC: 166 MG/DL (ref 65–105)
GLUCOSE BLD-MCNC: 171 MG/DL (ref 65–105)
GLUCOSE BLD-MCNC: 171 MG/DL (ref 65–105)
GLUCOSE SERPL-MCNC: 165 MG/DL (ref 74–99)
HCT VFR BLD AUTO: 32.5 % (ref 36–46)
HGB BLD-MCNC: 11 G/DL (ref 12–16)
LYMPHOCYTES NFR BLD: 0.6 K/UL (ref 1–4.8)
LYMPHOCYTES RELATIVE PERCENT: 9 % (ref 24–44)
MCH RBC QN AUTO: 27.6 PG (ref 26–34)
MCHC RBC AUTO-ENTMCNC: 33.9 G/DL (ref 31–37)
MCV RBC AUTO: 81.6 FL (ref 80–100)
MONOCYTES NFR BLD: 0.8 K/UL (ref 0.1–1.2)
MONOCYTES NFR BLD: 12 % (ref 2–11)
NEUTROPHILS NFR BLD: 77 % (ref 36–66)
NEUTS SEG NFR BLD: 5.6 K/UL (ref 1.8–7.7)
PLATELET # BLD AUTO: 193 K/UL (ref 140–450)
PMV BLD AUTO: 7.1 FL (ref 6–12)
POTASSIUM SERPL-SCNC: 3.8 MMOL/L (ref 3.7–5.3)
RBC # BLD AUTO: 3.99 M/UL (ref 4–5.2)
SODIUM SERPL-SCNC: 141 MMOL/L (ref 136–145)
WBC OTHER # BLD: 7.1 K/UL (ref 3.5–11)

## 2025-07-02 PROCEDURE — 6360000002 HC RX W HCPCS: Performed by: STUDENT IN AN ORGANIZED HEALTH CARE EDUCATION/TRAINING PROGRAM

## 2025-07-02 PROCEDURE — 99232 SBSQ HOSP IP/OBS MODERATE 35: CPT | Performed by: INTERNAL MEDICINE

## 2025-07-02 PROCEDURE — 82947 ASSAY GLUCOSE BLOOD QUANT: CPT

## 2025-07-02 PROCEDURE — 80048 BASIC METABOLIC PNL TOTAL CA: CPT

## 2025-07-02 PROCEDURE — 2580000003 HC RX 258: Performed by: STUDENT IN AN ORGANIZED HEALTH CARE EDUCATION/TRAINING PROGRAM

## 2025-07-02 PROCEDURE — 2500000003 HC RX 250 WO HCPCS: Performed by: STUDENT IN AN ORGANIZED HEALTH CARE EDUCATION/TRAINING PROGRAM

## 2025-07-02 PROCEDURE — 6360000002 HC RX W HCPCS: Performed by: NURSE PRACTITIONER

## 2025-07-02 PROCEDURE — 85025 COMPLETE CBC W/AUTO DIFF WBC: CPT

## 2025-07-02 PROCEDURE — 1200000000 HC SEMI PRIVATE

## 2025-07-02 PROCEDURE — 97535 SELF CARE MNGMENT TRAINING: CPT

## 2025-07-02 PROCEDURE — 36415 COLL VENOUS BLD VENIPUNCTURE: CPT

## 2025-07-02 PROCEDURE — 74018 RADEX ABDOMEN 1 VIEW: CPT

## 2025-07-02 RX ORDER — DIATRIZOATE MEGLUMINE AND DIATRIZOATE SODIUM 660; 100 MG/ML; MG/ML
300 SOLUTION ORAL; RECTAL
Status: DISCONTINUED | OUTPATIENT
Start: 2025-07-03 | End: 2025-07-03

## 2025-07-02 RX ORDER — DIATRIZOATE MEGLUMINE AND DIATRIZOATE SODIUM 660; 100 MG/ML; MG/ML
300 SOLUTION ORAL; RECTAL
Status: DISCONTINUED | OUTPATIENT
Start: 2025-07-02 | End: 2025-07-02

## 2025-07-02 RX ORDER — DIPHENHYDRAMINE HYDROCHLORIDE 50 MG/ML
12.5 INJECTION, SOLUTION INTRAMUSCULAR; INTRAVENOUS NIGHTLY PRN
Status: DISCONTINUED | OUTPATIENT
Start: 2025-07-02 | End: 2025-07-04

## 2025-07-02 RX ADMIN — SODIUM CHLORIDE: 0.9 INJECTION, SOLUTION INTRAVENOUS at 23:37

## 2025-07-02 RX ADMIN — SODIUM CHLORIDE, PRESERVATIVE FREE 40 MG: 5 INJECTION INTRAVENOUS at 08:24

## 2025-07-02 RX ADMIN — SODIUM CHLORIDE 250 ML: 0.9 INJECTION, SOLUTION INTRAVENOUS at 23:45

## 2025-07-02 RX ADMIN — SODIUM CHLORIDE, PRESERVATIVE FREE 10 ML: 5 INJECTION INTRAVENOUS at 08:25

## 2025-07-02 RX ADMIN — DIPHENHYDRAMINE HYDROCHLORIDE 12.5 MG: 50 INJECTION INTRAMUSCULAR; INTRAVENOUS at 23:27

## 2025-07-02 RX ADMIN — LABETALOL HYDROCHLORIDE 10 MG: 5 INJECTION, SOLUTION INTRAVENOUS at 23:31

## 2025-07-02 RX ADMIN — LABETALOL HYDROCHLORIDE 10 MG: 5 INJECTION, SOLUTION INTRAVENOUS at 12:24

## 2025-07-02 NOTE — PLAN OF CARE
Problem: Chronic Conditions and Co-morbidities  Goal: Patient's chronic conditions and co-morbidity symptoms are monitored and maintained or improved  7/2/2025 0534 by Gaviota Loaiza RN  Outcome: Progressing     Problem: Discharge Planning  Goal: Discharge to home or other facility with appropriate resources  7/2/2025 0534 by Gaviota Loaiza RN  Outcome: Progressing     Problem: Pain  Goal: Verbalizes/displays adequate comfort level or baseline comfort level  7/2/2025 0534 by Gaviota Loaiza RN  Outcome: Progressing     Problem: Safety - Adult  Goal: Free from fall injury  7/2/2025 0534 by Gaviota Loaiza RN  Outcome: Progressing     Problem: ABCDS Injury Assessment  Goal: Absence of physical injury  7/2/2025 0534 by Gaviota Loaiza RN  Outcome: Progressing     Problem: Nutrition Deficit:  Goal: Optimize nutritional status  7/2/2025 0534 by Gaviota Loaiza RN  Outcome: Progressing     Problem: Skin/Tissue Integrity  Goal: Skin integrity remains intact  Description: 1.  Monitor for areas of redness and/or skin breakdown  2.  Assess vascular access sites hourly  3.  Every 4-6 hours minimum:  Change oxygen saturation probe site  4.  Every 4-6 hours:  If on nasal continuous positive airway pressure, respiratory therapy assess nares and determine need for appliance change or resting period  7/2/2025 0534 by Gaviota Loaiza RN  Outcome: Progressing

## 2025-07-02 NOTE — PLAN OF CARE
Problem: Chronic Conditions and Co-morbidities  Goal: Patient's chronic conditions and co-morbidity symptoms are monitored and maintained or improved  7/2/2025 1358 by Tamiko Canela RN  Outcome: Progressing  Flowsheets (Taken 7/2/2025 0929)  Care Plan - Patient's Chronic Conditions and Co-Morbidity Symptoms are Monitored and Maintained or Improved:   Monitor and assess patient's chronic conditions and comorbid symptoms for stability, deterioration, or improvement   Update acute care plan with appropriate goals if chronic or comorbid symptoms are exacerbated and prevent overall improvement and discharge   Collaborate with multidisciplinary team to address chronic and comorbid conditions and prevent exacerbation or deterioration     Problem: Discharge Planning  Goal: Discharge to home or other facility with appropriate resources  7/2/2025 1358 by Tamiko Canela RN  Outcome: Progressing  Flowsheets (Taken 7/2/2025 0929)  Discharge to home or other facility with appropriate resources:   Identify barriers to discharge with patient and caregiver   Arrange for needed discharge resources and transportation as appropriate   Identify discharge learning needs (meds, wound care, etc)   Refer to discharge planning if patient needs post-hospital services based on physician order or complex needs related to functional status, cognitive ability or social support system     Problem: Pain  Goal: Verbalizes/displays adequate comfort level or baseline comfort level  7/2/2025 1358 by Tamiko Canela RN  Outcome: Progressing     Problem: Safety - Adult  Goal: Free from fall injury  7/2/2025 1358 by Tamiko Canela RN  Outcome: Progressing  Flowsheets (Taken 7/2/2025 1109)  Free From Fall Injury: Instruct family/caregiver on patient safety     Problem: Nutrition Deficit:  Goal: Optimize nutritional status  7/2/2025 1358 by Tamiko Canela RN  Outcome: Progressing     Problem: Skin/Tissue Integrity  Goal: Skin integrity remains  intact  Description: 1.  Monitor for areas of redness and/or skin breakdown  2.  Assess vascular access sites hourly  3.  Every 4-6 hours minimum:  Change oxygen saturation probe site  4.  Every 4-6 hours:  If on nasal continuous positive airway pressure, respiratory therapy assess nares and determine need for appliance change or resting period  7/2/2025 1358 by Tamiko Canela, RN  Outcome: Progressing  Flowsheets (Taken 7/2/2025 7675)  Skin Integrity Remains Intact:   Monitor for areas of redness and/or skin breakdown   Assess vascular access sites hourly   Turn and reposition as indicated   Positioning devices   Check visual cues for pain   Monitor skin under medical devices

## 2025-07-02 NOTE — CARE COORDINATION
[facility] Quality Flow/Interdisciplinary Rounds Progress Note    Quality Flow Rounds held on July 2, 2025 at 0930    Disciplines Attending:  Bedside Nurse, , and Nursing Unit Leadership    Barriers to Discharge: Clinical status    Anticipated Discharge Date:   7/4/25    Anticipated Discharge Disposition: Home w/ C    [facility] RISK OF UNPLANNED READMISSION 2.0             10.2 Total Score        Discussed patient goal for the day, patient clinical progression, and barriers to discharge. Plan for SBFT tomorrow. Patient to return home w/ [facility] at discharge. Accepted by [facility].      Jania Contreras RN  July 2, 2025

## 2025-07-02 NOTE — PROGRESS NOTES
[facility]  Office: [phone]  Curt Venegas, DO, Alberto Frye, DO, William Fiore DO, Lucho Panda, DO, Emily Carbone MD, Briana Velásquez MD, Mejia Trevino MD, Raissa Randolph MD,  Dilan Villasenor MD, Oxana Chau MD, Bertrand Millan MD,  Hank Beauchamp DO, Herminia Garzon MD, Ulisses Hicks MD, Shree Venegas DO, Chichi Lynch MD,  Jaden Bay DO, Erica Delgado MD, Farrah Johnson MD, Jim Randle MD,  Tanner Ivey MD, Eusebio Esparza MD, Geoffrey Jung MD, Amish Lundy MD, Lavell Ferreira MD, Hal Medrano MD, Olaf Regalado, DO, Suzi Waggoner MD, Harris Brush DO, Jewel Edmond MD, Hank Germain MD, Mohsin Reza, MD, Mike Dhaliwal MD, Shirley Waterhouse, CNP,  Becka Brown, CNP, Olaf Ragsdale, CNP,  Chikis Perry, RED, Ximena Phelps, CNP, Katey Woo, CNP, Tamiko Noel, CNP, Trish Goodman, CNP, Theresa Kraus, PA-C, Mikki Joshua, CNP, Vikram Davis, CNP,  Danielle Portillo, CNP, Jane Kaur, CNP, Jb Fairbanks, PA-C, Charity Escobar, PA-C, Katalina Henderson, CNP,        Sherita Amaya, CNS, Hailey Agarwal, CNP, Iram Smith, CNP         [facility]   IN-PATIENT SERVICE   [facility]    Progress Note    7/2/2025    7:44 AM    Name:   Barbie Arevalo  MRN:     [id number]     Acct:      [id number]   Room:   [address] [place] [address] Day:  4  Admit Date:  6/28/2025 11:18 AM    PCP:   Harley Peralta MD  Code Status:  Full Code    Subjective:     C/C:   Chief Complaint   Patient presents with    Abdominal Cramping    Decreased appetite     Patient states on Thursday she started having increased decreased appetite, abd cramping. Patient states she was dx with a UTI put on antibiotics on June 12th, for UTI, went to [facility] ER on Monday, dx with UTI, given Cipro, then abd cramping, decreased appetite, increased weakness, and some \"memory issues\".      Extremity Weakness     Interval History Status:       Patient seen and examined.  Continues to have  Av.1 °F (36.7 °C), Min:97.7 °F (36.5 °C), Max:98.4 °F (36.9 °C)    Recent Labs     25  1046 25  0555   POCGLU 209* 112* 145* 163*       I/O (24Hr):    Intake/Output Summary (Last 24 hours) at 2025 0744  Last data filed at 2025 2300  Gross per 24 hour   Intake 382.23 ml   Output 500 ml   Net -117.77 ml       Labs:  Hematology:  Recent Labs     25  0623 25  0546 25  0649   WBC 6.8 7.2 7.1   RBC 4.23 3.99* 3.99*   HGB 11.6* 10.9* 11.0*   HCT 34.9* 32.8* 32.5*   MCV 82.4 82.2 81.6   MCH 27.5 27.4 27.6   MCHC 33.3 33.3 33.9   RDW 15.3 15.6* 15.2    199 193   MPV 7.4 7.4 7.1     Chemistry:  Recent Labs     25  0623 25  0546 25  0649    142 141   K 3.8 3.6* 3.8    108* 107   CO2 24 22 22   GLUCOSE 152* 132* 165*   BUN 30* 24* 16   CREATININE 1.3* 1.0* 0.9   ANIONGAP 11 12 12   LABGLOM 40* 55* 63   CALCIUM 8.7 8.4* 8.5*     Recent Labs     25  0739 25  1046 25  1623 25  0555   POCGLU 136* 127* 209* 112* 145* 163*     ABG:No results found for: \"POCPH\", \"PHART\", \"PH\", \"POCPCO2\", \"KAA9TMI\", \"PCO2\", \"POCPO2\", \"PO2ART\", \"PO2\", \"POCHCO3\", \"KJN4HJK\", \"HCO3\", \"NBEA\", \"PBEA\", \"BEART\", \"BE\", \"THGBART\", \"THB\", \"FIO2FLK\", \"PCWL9DNC\", \"A8THQRAF\", \"O2SAT\", \"FIO2\"  No results found for: \"SPECIAL\"  Lab Results   Component Value Date/Time    CULTURE NO GROWTH 2025 05:38 AM       Radiology:  XR ABDOMEN (KUB) (SINGLE AP VIEW)  Result Date: 2025  1. NG tube in satisfactory position. 2. Dilated small bowel loops in the mid abdomen suggests small bowel obstruction.     CT ABDOMEN PELVIS WO CONTRAST Additional Contrast? None  Result Date: 2025  High-grade small bowel obstruction with transition point seen in the pelvis likely related to adhesions.  No evidence of pneumatosis or free air. Recommend surgical consultation.       Physical Examination:       General

## 2025-07-02 NOTE — PLAN OF CARE
Problem: Chronic Conditions and Co-morbidities  Goal: Patient's chronic conditions and co-morbidity symptoms are monitored and maintained or improved  7/2/2025 0008 by Haley Epstein RN  Outcome: Progressing  Flowsheets (Taken 7/1/2025 2217)  Care Plan - Patient's Chronic Conditions and Co-Morbidity Symptoms are Monitored and Maintained or Improved: Monitor and assess patient's chronic conditions and comorbid symptoms for stability, deterioration, or improvement     Problem: Discharge Planning  Goal: Discharge to home or other facility with appropriate resources  Outcome: Progressing  Flowsheets (Taken 7/1/2025 2217)  Discharge to home or other facility with appropriate resources:   Identify barriers to discharge with patient and caregiver   Arrange for needed discharge resources and transportation as appropriate   Identify discharge learning needs (meds, wound care, etc)   Refer to discharge planning if patient needs post-hospital services based on physician order or complex needs related to functional status, cognitive ability or social support system     Problem: Pain  Goal: Verbalizes/displays adequate comfort level or baseline comfort level  Outcome: Progressing  Flowsheets (Taken 7/1/2025 2217)  Verbalizes/displays adequate comfort level or baseline comfort level:   Assess pain using appropriate pain scale   Implement non-pharmacological measures as appropriate and evaluate response   Encourage patient to monitor pain and request assistance   Administer analgesics based on type and severity of pain and evaluate response   Notify Licensed Independent Practitioner if interventions unsuccessful or patient reports new pain     Problem: Safety - Adult  Goal: Free from fall injury  Outcome: Progressing     Problem: ABCDS Injury Assessment  Goal: Absence of physical injury  Outcome: Progressing     Problem: Nutrition Deficit:  Goal: Optimize nutritional status  Outcome: Progressing     Problem: Skin/Tissue

## 2025-07-02 NOTE — PROGRESS NOTES
Physical Therapy        Physical Therapy Cancel Note      DATE: 2025    NAME: Barbie Arevalo  MRN: [id number]   : 1940      Patient not seen this date for Physical Therapy due to:    Patient Declined: Pt declined PT stating she just got comfortable and has cold pack L foot since had OT earlier. PT offer for step training (pt has velcro sandals in closet) but pt repeat decline visit. PLAN: continue to pursue PT treatment as able tomorrow.      Electronically signed by Niesha Loo PT on 2025 at 4:07 PM

## 2025-07-02 NOTE — PROGRESS NOTES
Spiritual Health History and Assessment/Progress Note  [facility]    (P) Spiritual/Emotional Needs,  , Life Adjustments, Adjustment to illness,      Name: Barbie Arevalo MRN: [id number]    Age: 84 y.o.     Sex: female   Language: English   Yazidism: Other   SBO (small bowel obstruction) (HCC)     Date: 7/2/2025            Total Time Calculated: (P) 5 min              Spiritual Assessment began in [address]        Referral/Consult From: (P) Rounding   Encounter Overview/Reason: (P) Spiritual/Emotional Needs  Service Provided For: (P) Patient and family together    Annika, Belief, Meaning:   Patient has beliefs or practices that help with coping during difficult times  Family/Friends Other: Not assessed      Importance and Influence:  Patient has no beliefs influential to healthcare decision-making identified during this visit  Family/Friends have no beliefs influential to healthcare decision-making identified during this visit    Community:  Patient feels well-supported. Support system includes: Children and Extended family  Family/Friends feel well-supported. Support system includes: Parent/s and Extended family    Assessment and Plan of Care:     Patient Interventions include: Facilitated expression of thoughts and feelings and Affirmed coping skills/support systems  Family/Friends Interventions include: Facilitated expression of thoughts and feelings    Patient Plan of Care: Spiritual Care available upon further referral  Family/Friends Plan of Care: Spiritual Care available upon further referral    Electronically signed by FANNIE Arzola on 7/2/2025 at 4:59 PM

## 2025-07-02 NOTE — PROGRESS NOTES
PATIENT NAME: Barbie Arevalo     TODAY'S DATE: 7/2/2025, 11:51 AM    SUBJECTIVE:    Patient is seen and examined this morning, no acute event overnight.  Ngt to liws with minimal output. Had small bm pebble like . No nausea this am.        OBJECTIVE:   VITALS:  BP (!) 161/66   Pulse 73   Temp 98.1 °F (36.7 °C) (Oral)   Resp 12   Ht 1.549 m (5' 0.98\")   Wt 70 kg (154 lb 5.2 oz)   SpO2 90%   BMI 29.17 kg/m²      INTAKE/OUTPUT:      Intake/Output Summary (Last 24 hours) at 7/2/2025 1151  Last data filed at 7/1/2025 2300  Gross per 24 hour   Intake 382.23 ml   Output 500 ml   Net -117.77 ml                 CONSTITUTIONAL:  awake and alert.  No acute distress.  Patient is good spirits.  Patient is not toxic or ill appearing.  ABDOMEN:   Abdomen soft and nontender. Patient has improved abdominal distention compared to yesterday.    Skin: warm well perfused  CV: RRR  Pulm: normal work of breathing on room air    Data:  CBC:   Lab Results   Component Value Date/Time    WBC 7.1 07/02/2025 06:49 AM    RBC 3.99 07/02/2025 06:49 AM    HGB 11.0 07/02/2025 06:49 AM    HCT 32.5 07/02/2025 06:49 AM    MCV 81.6 07/02/2025 06:49 AM    MCH 27.6 07/02/2025 06:49 AM    MCHC 33.9 07/02/2025 06:49 AM    RDW 15.2 07/02/2025 06:49 AM     07/02/2025 06:49 AM    MPV 7.1 07/02/2025 06:49 AM     Component  Ref Range & Units (hover) 6/29/25 0703 6/28/25 2117   Lactic Acid 0.9 1.2       ASSESSMENT   Small bowel obstruction  Hx colectomy  Hx DM     Plan  Maintain ngt LIWS, npo  Patient having small pebble like bowel movements  but seems to be from colon and not true passage through obstructed point.   Will give another day of decompression and see if more significant bowel function, will plan for small bowel follow through tomorrow am  Strict record intake output  Continue ambulate  Rest of the care per primary      Electronically signed by Sharifa Esquivel DO  on 7/2/2025 at 11:51 AM      Attending Physician

## 2025-07-02 NOTE — PROGRESS NOTES
is needed for putting on and taking off regular lower body clothing?: A Little  How much help is needed for bathing (which includes washing, rinsing, drying)?: A Little  How much help is needed for toileting (which includes using toilet, bedpan, or urinal)?: A Little  How much help is needed for putting on and taking off regular upper body clothing?: A Little  How much help is needed for taking care of personal grooming?: A Little  How much help for eating meals?: None  AM-[facility] Inpatient Daily Activity Raw Score: 19  AM-[facility] Inpatient ADL T-Scale Score : 40.22  ADL Inpatient CMS 0-100% Score: 42.8  ADL Inpatient CMS G-Code Modifier : CK    Restrictions/Precautions  Restrictions/Precautions  Restrictions/Precautions: Fall Risk  Activity Level: Up as Tolerated  Required Braces or Orthoses?: No  Position Activity Restriction  Other Position/Activity Restrictions: NG tube in place to intermittent suction, clamped for mobility per RN       Subjective  General  Patient assessed for rehabilitation services?: Yes  Family / Caregiver Present: No  Subjective  Subjective: checked with RN and approved session. pt supine in bed and agreeable. Pt reports an \"aching\" pain in L foot; ice provided for comfort. pt was pleasant throughout          Objective  Orientation  Overall Orientation Status: Within Functional Limits  Cognition  Overall Cognitive Status: Exceptions  Arousal/Alertness: Appears intact  Following Commands: Appears intact  Attention Span: Appears intact  Problem Solving: Assistance required to identify errors made;Assistance required to generate solutions  Insights: Decreased awareness of deficits  Cognition Comment: pt required encouragement for tasks    Activities of Daily Living  Grooming: Contact guard assistance  Grooming Skilled Clinical Factors: stood sink side to complete hand hygiene after toileting, rested forearms on sink for BUE support  Toileting: Contact guard assistance  Toileting Skilled Clinical  minutes static/dynamic standing tolerance for increased engagement in ADL tasks    Plan  Occupational Therapy Plan  Times Per Week: 5-6x/wk  Current Treatment Recommendations: Strengthening, Balance training, Functional mobility training, Endurance training, Patient/Caregiver education & training, Safety education & training, Equipment evaluation, education, & procurement, Self-Care / ADL, Coordination training    Minutes  OT Individual Minutes  Time In: 1420  Time Out: 1446  Minutes: 26  Time Code Minutes   Timed Code Treatment Minutes: 26 Minutes    Electronically signed by BRYANNA Akhtar on 7/2/25 at 3:01 PM EDT

## 2025-07-03 ENCOUNTER — APPOINTMENT (OUTPATIENT)
Dept: GENERAL RADIOLOGY | Age: 85
DRG: 390 | End: 2025-07-03
Payer: MEDICARE

## 2025-07-03 LAB
ABO + RH BLD: NORMAL
ANION GAP SERPL CALCULATED.3IONS-SCNC: 13 MMOL/L (ref 9–16)
ARM BAND NUMBER: NORMAL
BASOPHILS # BLD: 0 K/UL (ref 0–0.2)
BASOPHILS NFR BLD: 0 % (ref 0–2)
BLOOD BANK SAMPLE EXPIRATION: NORMAL
BLOOD GROUP ANTIBODIES SERPL: NEGATIVE
BUN SERPL-MCNC: 15 MG/DL (ref 8–23)
CALCIUM SERPL-MCNC: 9.2 MG/DL (ref 8.6–10.4)
CHLORIDE SERPL-SCNC: 105 MMOL/L (ref 98–107)
CO2 SERPL-SCNC: 24 MMOL/L (ref 20–31)
CREAT SERPL-MCNC: 0.9 MG/DL (ref 0.6–0.9)
EOSINOPHIL # BLD: 0.1 K/UL (ref 0–0.4)
EOSINOPHILS RELATIVE PERCENT: 1 % (ref 1–4)
ERYTHROCYTE [DISTWIDTH] IN BLOOD BY AUTOMATED COUNT: 15.4 % (ref 12.5–15.4)
GFR, ESTIMATED: 63 ML/MIN/1.73M2
GLUCOSE BLD-MCNC: 144 MG/DL (ref 65–105)
GLUCOSE BLD-MCNC: 162 MG/DL (ref 65–105)
GLUCOSE BLD-MCNC: 194 MG/DL (ref 65–105)
GLUCOSE BLD-MCNC: 214 MG/DL (ref 65–105)
GLUCOSE SERPL-MCNC: 169 MG/DL (ref 74–99)
HCT VFR BLD AUTO: 33.3 % (ref 36–46)
HGB BLD-MCNC: 11.3 G/DL (ref 12–16)
LYMPHOCYTES NFR BLD: 0.6 K/UL (ref 1–4.8)
LYMPHOCYTES RELATIVE PERCENT: 6 % (ref 24–44)
MCH RBC QN AUTO: 27.8 PG (ref 26–34)
MCHC RBC AUTO-ENTMCNC: 34 G/DL (ref 31–37)
MCV RBC AUTO: 81.8 FL (ref 80–100)
MONOCYTES NFR BLD: 0.9 K/UL (ref 0.1–1.2)
MONOCYTES NFR BLD: 11 % (ref 2–11)
NEUTROPHILS NFR BLD: 82 % (ref 36–66)
NEUTS SEG NFR BLD: 7.4 K/UL (ref 1.8–7.7)
PLATELET # BLD AUTO: 211 K/UL (ref 140–450)
PMV BLD AUTO: 7.1 FL (ref 6–12)
POTASSIUM SERPL-SCNC: 4 MMOL/L (ref 3.7–5.3)
RBC # BLD AUTO: 4.08 M/UL (ref 4–5.2)
SODIUM SERPL-SCNC: 142 MMOL/L (ref 136–145)
WBC OTHER # BLD: 9.1 K/UL (ref 3.5–11)

## 2025-07-03 PROCEDURE — 6360000002 HC RX W HCPCS: Performed by: NURSE PRACTITIONER

## 2025-07-03 PROCEDURE — 74250 X-RAY XM SM INT 1CNTRST STD: CPT

## 2025-07-03 PROCEDURE — 99232 SBSQ HOSP IP/OBS MODERATE 35: CPT | Performed by: INTERNAL MEDICINE

## 2025-07-03 PROCEDURE — 6360000004 HC RX CONTRAST MEDICATION: Performed by: SURGERY

## 2025-07-03 PROCEDURE — 2500000003 HC RX 250 WO HCPCS: Performed by: STUDENT IN AN ORGANIZED HEALTH CARE EDUCATION/TRAINING PROGRAM

## 2025-07-03 PROCEDURE — 6370000000 HC RX 637 (ALT 250 FOR IP): Performed by: INTERNAL MEDICINE

## 2025-07-03 PROCEDURE — 2500000003 HC RX 250 WO HCPCS: Performed by: INTERNAL MEDICINE

## 2025-07-03 PROCEDURE — 97116 GAIT TRAINING THERAPY: CPT

## 2025-07-03 PROCEDURE — 86900 BLOOD TYPING SEROLOGIC ABO: CPT

## 2025-07-03 PROCEDURE — 82947 ASSAY GLUCOSE BLOOD QUANT: CPT

## 2025-07-03 PROCEDURE — 86850 RBC ANTIBODY SCREEN: CPT

## 2025-07-03 PROCEDURE — 36415 COLL VENOUS BLD VENIPUNCTURE: CPT

## 2025-07-03 PROCEDURE — 97535 SELF CARE MNGMENT TRAINING: CPT

## 2025-07-03 PROCEDURE — 1200000000 HC SEMI PRIVATE

## 2025-07-03 PROCEDURE — 6360000002 HC RX W HCPCS: Performed by: STUDENT IN AN ORGANIZED HEALTH CARE EDUCATION/TRAINING PROGRAM

## 2025-07-03 PROCEDURE — 85025 COMPLETE CBC W/AUTO DIFF WBC: CPT

## 2025-07-03 PROCEDURE — 86901 BLOOD TYPING SEROLOGIC RH(D): CPT

## 2025-07-03 PROCEDURE — 80048 BASIC METABOLIC PNL TOTAL CA: CPT

## 2025-07-03 RX ORDER — DIATRIZOATE MEGLUMINE AND DIATRIZOATE SODIUM 660; 100 MG/ML; MG/ML
300 SOLUTION ORAL; RECTAL
Status: CANCELLED | OUTPATIENT
Start: 2025-07-03

## 2025-07-03 RX ORDER — DIATRIZOATE MEGLUMINE AND DIATRIZOATE SODIUM 660; 100 MG/ML; MG/ML
300 SOLUTION ORAL; RECTAL
Status: COMPLETED | OUTPATIENT
Start: 2025-07-03 | End: 2025-07-03

## 2025-07-03 RX ORDER — METOPROLOL TARTRATE 1 MG/ML
5 INJECTION, SOLUTION INTRAVENOUS EVERY 6 HOURS SCHEDULED
Status: DISCONTINUED | OUTPATIENT
Start: 2025-07-03 | End: 2025-07-04

## 2025-07-03 RX ADMIN — INSULIN LISPRO 1 UNITS: 100 INJECTION, SOLUTION INTRAVENOUS; SUBCUTANEOUS at 20:25

## 2025-07-03 RX ADMIN — SODIUM CHLORIDE, PRESERVATIVE FREE 10 ML: 5 INJECTION INTRAVENOUS at 08:25

## 2025-07-03 RX ADMIN — DIPHENHYDRAMINE HYDROCHLORIDE 12.5 MG: 50 INJECTION INTRAMUSCULAR; INTRAVENOUS at 22:45

## 2025-07-03 RX ADMIN — SODIUM CHLORIDE, PRESERVATIVE FREE 10 ML: 5 INJECTION INTRAVENOUS at 20:10

## 2025-07-03 RX ADMIN — SODIUM CHLORIDE, PRESERVATIVE FREE 40 MG: 5 INJECTION INTRAVENOUS at 08:25

## 2025-07-03 RX ADMIN — METOROPROLOL TARTRATE 5 MG: 5 INJECTION, SOLUTION INTRAVENOUS at 13:26

## 2025-07-03 RX ADMIN — METOROPROLOL TARTRATE 5 MG: 5 INJECTION, SOLUTION INTRAVENOUS at 20:10

## 2025-07-03 RX ADMIN — INSULIN LISPRO 1 UNITS: 100 INJECTION, SOLUTION INTRAVENOUS; SUBCUTANEOUS at 12:09

## 2025-07-03 RX ADMIN — DIATRIZOATE MEGLUMINE AND DIATRIZOATE SODIUM 300 ML: 660; 100 LIQUID ORAL; RECTAL at 08:31

## 2025-07-03 NOTE — PROGRESS NOTES
Spiritual Health History and Assessment/Progress Note  [facility]    Spiritual/Emotional Needs,  , Life Adjustments, Adjustment to illness,      Name: Barbie Arevalo MRN: [id number]    Age: 84 y.o.     Sex: female   Language: English   Scientology: Other   SBO (small bowel obstruction) ([facility])     Date: 7/3/2025            Total Time Calculated: (P) 15 min              Spiritual Assessment began in [address]        Referral/Consult From: (P) Rounding   Encounter Overview/Reason: Spiritual/Emotional Needs  Service Provided For: (P) Patient    Writer met w/ pt in pt's room. Pt expressed fears concerning death/afterlife. Pt said that [facility] and would like to \"return to Anabaptist\" Pt spoke of her desire to visit a nearby [employer] . Pt shared about questioning her former annika and not being sure what she believes about God and the afterlife. Pt also shared about her family and how meaninful her family is to her. Writer provided a ministry of presence, empathic/active listening, and prayed with patient at pt's request.     Annika, Belief, Meaning:   Patient Other: Questioning annika   Family/Friends No family/friends present      Importance and Influence:  Patient has spiritual/personal beliefs that influence decisions regarding their health  Family/Friends No family/friends present    Community:  Patient feels well-supported. Support system includes: Children and Extended family  Family/Friends No family/friends present    Assessment and Plan of Care:     Patient Interventions include: Facilitated expression of thoughts and feelings, Explored spiritual coping/struggle/distress, Engaged in theological reflection, Affirmed coping skills/support systems, and Facilitated life review and/ or legacy  Family/Friends Interventions include: No family/friends present    Patient Plan of Care: Spiritual Care available upon further referral  Family/Friends Plan of Care: No family/friends

## 2025-07-03 NOTE — PROGRESS NOTES
Physical Therapy  Facility/Department: [address]   Physical Therapy Daily Treatment Note    Patient Name: Barbie Arevalo        MRN: [id number]    : 1940    Date of Service: 7/3/2025    Chief Complaint   Patient presents with    Abdominal Cramping    Decreased appetite     Patient states on Thursday she started having increased decreased appetite, abd cramping. Patient states she was dx with a UTI put on antibiotics on , for UTI, went to [facility] ER on Monday, dx with UTI, given Cipro, then abd cramping, decreased appetite, increased weakness, and some \"memory issues\".      Extremity Weakness     Past Medical History:  has a past medical history of Diabetes 1.5, managed as type 2 (HCC) and Hypertension.  Past Surgical History:  has no past surgical history on file.    Discharge Recommendations  Discharge Recommendations: No therapy recommended at discharge  PT Equipment Recommendations  Equipment Needed: Yes  Mobility Devices: Walker  Walker: Rolling  Other: also has cane if needed    Assessment  Body Structures, Functions, Activity Limitations Requiring Skilled Therapeutic Intervention: Decreased functional mobility   Assessment: The patient was admitted due to SBO with a hx of colectomy 21 years ago. At baseline, the patient lives alone and requires no assistance with ADLs and functional mobility. The patient resides in a 1 story home with laundry in the basement and 2 steps to enter. The patient ambulates with cane when in community and no device in her house at baseline. The patient currently has NG tube clamped. The patient is able to perform bed mobility with supervision, transfers with cane or walker with supervision, and ambulate 130ft wtih RW with supervision. The patient would benefit from acute PT to improve functional independence back to baseline. The patient would be appropriate to return to prior living arrangements with prior assistance as needed. She reports having

## 2025-07-03 NOTE — PROGRESS NOTES
[facility]  Office: [phone]  Curt Venegas, DO, Alberto Frye, DO, William Fiore DO, Lucho Panda, DO, Emily Carbone MD, Briana Velásquez MD, Mejia Trevino MD, Raissa Randolph MD,  Dilan Villasenor MD, Oxana Chau MD, Bertrand Millan MD,  Hank Beauchamp DO, Herminia Garzon MD, Ulisses Hicks MD, Shree Venegas DO, Chichi Lynch MD,  Jaden Bay DO, Erica Delgado MD, Farrah Johnson MD, Jim Randle MD,  Tanner Ivey MD, Eusebio Esparza MD, Geoffrey Jung MD, Amish Lundy MD, Lavell Ferreira MD, Hal Medrano MD, Olaf Regalado, DO, Suzi Waggoner MD, Harris Brush DO, Jewel Edmond MD, Hank Germain MD, Mohsin Reza, MD, Mike Dhaliwal MD, Shirley Waterhouse, CNP,  Becka Brown, CNP, Olaf Ragsdale, CNP,  Chikis Perry, RED, Ximena Phelps, CNP, Katey Woo, CNP, Tamiko oNel, CNP, Trish Goodman, CNP, Theresa Kraus, PA-C, Mikki Joshua, CNP, Vikram Davis, CNP,  Danielle Portillo, CNP, Jane Kaur, CNP, Jb Fairbanks, PA-C, Charity Escobar, PA-C, Katalina Henderson, CNP,        Sherita Amaya, CNS, Hailey Agarwal, CNP, Iram Smith, CNP         [facility]   IN-PATIENT SERVICE   [facility]    Progress Note    7/3/2025    7:52 AM    Name:   Barbie Arevalo  MRN:     [id number]     Acct:      [id number]   Room:   [address] [place] [address] Day:  5  Admit Date:  6/28/2025 11:18 AM    PCP:   Harley Peralta MD  Code Status:  Full Code    Subjective:     C/C:   Chief Complaint   Patient presents with    Abdominal Cramping    Decreased appetite     Patient states on Thursday she started having increased decreased appetite, abd cramping. Patient states she was dx with a UTI put on antibiotics on June 12th, for UTI, went to [facility] ER on Monday, dx with UTI, given Cipro, then abd cramping, decreased appetite, increased weakness, and some \"memory issues\".      Extremity Weakness     Interval History Status:       Patient seen and examined reports feeling

## 2025-07-03 NOTE — PROGRESS NOTES
PATIENT NAME: Barbie Arevalo     TODAY'S DATE: 7/3/2025, 12:12 PM    SUBJECTIVE:      Patient was examined this a.m. No acute events overnight.  Patient appears well.  Patient did note some abdominal distention but her abdomen remains soft and non-tender.  She noted some nausea post Gastrografin administration, but this has since resolved.  NG tube remains clamped.  Patient denies any other complaints at this time.     OBJECTIVE:   VITALS:  BP (!) 174/70   Pulse 74   Temp 97.3 °F (36.3 °C) (Oral)   Resp 18   Ht 1.549 m (5' 0.98\")   Wt 70 kg (154 lb 5.2 oz)   SpO2 96%   BMI 29.17 kg/m²      INTAKE/OUTPUT:      Intake/Output Summary (Last 24 hours) at 7/3/2025 1212  Last data filed at 7/3/2025 0827  Gross per 24 hour   Intake 426.13 ml   Output 1175 ml   Net -748.87 ml                 CONSTITUTIONAL:  awake and alert.  No acute distress.  Patient is good spirits.  Patient is not toxic or ill appearing.  ABDOMEN:   Abdomen soft and nontender. Patient has improved abdominal distention compared to yesterday.    Skin: warm well perfused  CV: RRR  Pulm: normal work of breathing on room air    Data:  CBC:   Lab Results   Component Value Date/Time    WBC 9.1 07/03/2025 06:53 AM    RBC 4.08 07/03/2025 06:53 AM    HGB 11.3 07/03/2025 06:53 AM    HCT 33.3 07/03/2025 06:53 AM    MCV 81.8 07/03/2025 06:53 AM    MCH 27.8 07/03/2025 06:53 AM    MCHC 34.0 07/03/2025 06:53 AM    RDW 15.4 07/03/2025 06:53 AM     07/03/2025 06:53 AM    MPV 7.1 07/03/2025 06:53 AM     Component  Ref Range & Units (hover) 6/29/25 0703 6/28/25 2117   Lactic Acid 0.9 1.2       ASSESSMENT   Small bowel obstruction  Hx colectomy  Hx DM     Plan  Patient tolerating clamped NG tube well.  Will continue to keep the NG tube clamped, as well as keep patient NPO.  Patient did have another small bowel movement similar to yesterday.  Will continue to monitor.  Small bowel follow-through was performed this morning and was reviewed.

## 2025-07-03 NOTE — PLAN OF CARE
Problem: Chronic Conditions and Co-morbidities  Goal: Patient's chronic conditions and co-morbidity symptoms are monitored and maintained or improved  7/3/2025 1104 by Tamiko Canela RN  Outcome: Progressing  Flowsheets (Taken 7/3/2025 0825)  Care Plan - Patient's Chronic Conditions and Co-Morbidity Symptoms are Monitored and Maintained or Improved:   Monitor and assess patient's chronic conditions and comorbid symptoms for stability, deterioration, or improvement   Collaborate with multidisciplinary team to address chronic and comorbid conditions and prevent exacerbation or deterioration   Update acute care plan with appropriate goals if chronic or comorbid symptoms are exacerbated and prevent overall improvement and discharge     Problem: Discharge Planning  Goal: Discharge to home or other facility with appropriate resources  7/3/2025 1104 by Tamiko Canela RN  Outcome: Progressing  Flowsheets (Taken 7/3/2025 0825)  Discharge to home or other facility with appropriate resources:   Identify barriers to discharge with patient and caregiver   Arrange for needed discharge resources and transportation as appropriate   Identify discharge learning needs (meds, wound care, etc)   Refer to discharge planning if patient needs post-hospital services based on physician order or complex needs related to functional status, cognitive ability or social support system     Problem: Pain  Goal: Verbalizes/displays adequate comfort level or baseline comfort level  7/3/2025 1104 by Tamiko Canela RN  Outcome: Progressing     Problem: Safety - Adult  Goal: Free from fall injury  7/3/2025 1104 by Tamiko Canela RN  Outcome: Progressing     Problem: ABCDS Injury Assessment  Goal: Absence of physical injury  7/3/2025 1104 by Tamiko Canela RN  Outcome: Progressing     Problem: Nutrition Deficit:  Goal: Optimize nutritional status  7/3/2025 1104 by Tamiko Canela RN  Outcome: Progressing     Problem: Skin/Tissue  Integrity  Goal: Skin integrity remains intact  Description: 1.  Monitor for areas of redness and/or skin breakdown  2.  Assess vascular access sites hourly  3.  Every 4-6 hours minimum:  Change oxygen saturation probe site  4.  Every 4-6 hours:  If on nasal continuous positive airway pressure, respiratory therapy assess nares and determine need for appliance change or resting period  7/3/2025 1104 by Tamiko Canela, RN  Outcome: Progressing  Flowsheets (Taken 7/3/2025 7052)  Skin Integrity Remains Intact:   Monitor for areas of redness and/or skin breakdown   Assess vascular access sites hourly   Turn and reposition as indicated   Positioning devices   Check visual cues for pain   Monitor skin under medical devices

## 2025-07-03 NOTE — PROGRESS NOTES
Occupational Therapy  Occupational Therapy Daily Treatment Note  Facility/Department: [address]   Patient Name: Barbie Arevalo        MRN: [id number]    : 1940    Date of Service: 7/3/2025    Chief Complaint   Patient presents with    Abdominal Cramping    Decreased appetite     Patient states on Thursday she started having increased decreased appetite, abd cramping. Patient states she was dx with a UTI put on antibiotics on , for UTI, went to [facility] ER on Monday, dx with UTI, given Cipro, then abd cramping, decreased appetite, increased weakness, and some \"memory issues\".      Extremity Weakness     Past Medical History:  has a past medical history of Diabetes 1.5, managed as type 2 (HCC) and Hypertension.  Past Surgical History:  has no past surgical history on file.    Discharge Recommendations  Discharge Recommendations: Patient would benefit from continued therapy after discharge       Assessment  Performance deficits / Impairments: Decreased functional mobility ;Decreased ADL status;Decreased safe awareness;Decreased cognition;Decreased endurance;Decreased balance;Decreased high-level IADLs  Assessment: Pt presents with above noted deficits impacting pt's ADL status requiring CGA for functional transfers/functional mobility this date with use of cane and CGA for engagement in ADL tasks. At baseline, pt performs ADL tasks independently and performs functional transfers/functional mobility with mod IND using cane. Pt to benefit from continued therapy services while hospitalized and at discharge to maximize pt's safety and independence in performing functional tasks.  Prognosis: Good  REQUIRES OT FOLLOW-UP: Yes  Safety Devices  Type of Devices: Call light within reach;Nurse notified;Gait belt (pt left seated EOB as PT entered the room to work with pt)  Restraints  Restraints Initially in Place: No    AM-PAC  AM-PAC Daily Activity - Inpatient   How much help is needed for putting

## 2025-07-03 NOTE — CARE COORDINATION
[facility] Quality Flow/Interdisciplinary Rounds Progress Note    Quality Flow Rounds held on July 3, 2025 at 0930    Disciplines Attending:  Bedside Nurse, , and Nursing Unit Leadership    Barriers to Discharge: clinical status    Anticipated Discharge Date:   7/5/25    Anticipated Discharge Disposition: Home w/ [facility] RISK OF UNPLANNED READMISSION 2.0             9.9 Total Score        Discussed patient goal for the day, patient clinical progression, and barriers to discharge. SBFT today. Gen Surg following. Patient plans on returning home with [facility]. [facility] order and BELEN needed at discharge.      Jania Contreras RN  July 3, 2025

## 2025-07-03 NOTE — PLAN OF CARE
Problem: Chronic Conditions and Co-morbidities  Goal: Patient's chronic conditions and co-morbidity symptoms are monitored and maintained or improved  Outcome: Progressing  Flowsheets (Taken 7/2/2025 2025)  Care Plan - Patient's Chronic Conditions and Co-Morbidity Symptoms are Monitored and Maintained or Improved:   Monitor and assess patient's chronic conditions and comorbid symptoms for stability, deterioration, or improvement   Collaborate with multidisciplinary team to address chronic and comorbid conditions and prevent exacerbation or deterioration   Update acute care plan with appropriate goals if chronic or comorbid symptoms are exacerbated and prevent overall improvement and discharge     Problem: Discharge Planning  Goal: Discharge to home or other facility with appropriate resources  Outcome: Progressing  Flowsheets (Taken 7/2/2025 2025)  Discharge to home or other facility with appropriate resources:   Identify barriers to discharge with patient and caregiver   Arrange for needed discharge resources and transportation as appropriate   Identify discharge learning needs (meds, wound care, etc)   Refer to discharge planning if patient needs post-hospital services based on physician order or complex needs related to functional status, cognitive ability or social support system     Problem: Pain  Goal: Verbalizes/displays adequate comfort level or baseline comfort level  Outcome: Progressing  Flowsheets (Taken 7/2/2025 2025)  Verbalizes/displays adequate comfort level or baseline comfort level:   Encourage patient to monitor pain and request assistance   Assess pain using appropriate pain scale   Administer analgesics based on type and severity of pain and evaluate response   Implement non-pharmacological measures as appropriate and evaluate response   Notify Licensed Independent Practitioner if interventions unsuccessful or patient reports new pain     Problem: Safety - Adult  Goal: Free from fall

## 2025-07-04 LAB
ANION GAP SERPL CALCULATED.3IONS-SCNC: 11 MMOL/L (ref 9–16)
BASOPHILS # BLD: 0 K/UL (ref 0–0.2)
BASOPHILS NFR BLD: 1 % (ref 0–2)
BUN SERPL-MCNC: 18 MG/DL (ref 8–23)
CALCIUM SERPL-MCNC: 9 MG/DL (ref 8.6–10.4)
CHLORIDE SERPL-SCNC: 105 MMOL/L (ref 98–107)
CO2 SERPL-SCNC: 26 MMOL/L (ref 20–31)
CREAT SERPL-MCNC: 0.8 MG/DL (ref 0.6–0.9)
EOSINOPHIL # BLD: 0.2 K/UL (ref 0–0.4)
EOSINOPHILS RELATIVE PERCENT: 2 % (ref 1–4)
ERYTHROCYTE [DISTWIDTH] IN BLOOD BY AUTOMATED COUNT: 15.2 % (ref 12.5–15.4)
GFR, ESTIMATED: 72 ML/MIN/1.73M2
GLUCOSE BLD-MCNC: 198 MG/DL (ref 65–105)
GLUCOSE BLD-MCNC: 200 MG/DL (ref 65–105)
GLUCOSE BLD-MCNC: 203 MG/DL (ref 65–105)
GLUCOSE BLD-MCNC: 259 MG/DL (ref 65–105)
GLUCOSE SERPL-MCNC: 174 MG/DL (ref 74–99)
HCT VFR BLD AUTO: 31.9 % (ref 36–46)
HGB BLD-MCNC: 11 G/DL (ref 12–16)
LYMPHOCYTES NFR BLD: 0.8 K/UL (ref 1–4.8)
LYMPHOCYTES RELATIVE PERCENT: 10 % (ref 24–44)
MAGNESIUM SERPL-MCNC: 1.5 MG/DL (ref 1.6–2.4)
MCH RBC QN AUTO: 27.9 PG (ref 26–34)
MCHC RBC AUTO-ENTMCNC: 34.5 G/DL (ref 31–37)
MCV RBC AUTO: 80.9 FL (ref 80–100)
MONOCYTES NFR BLD: 0.7 K/UL (ref 0.1–1.2)
MONOCYTES NFR BLD: 10 % (ref 2–11)
NEUTROPHILS NFR BLD: 77 % (ref 36–66)
NEUTS SEG NFR BLD: 6.1 K/UL (ref 1.8–7.7)
PLATELET # BLD AUTO: 223 K/UL (ref 140–450)
PMV BLD AUTO: 7.1 FL (ref 6–12)
POTASSIUM SERPL-SCNC: 3.4 MMOL/L (ref 3.7–5.3)
RBC # BLD AUTO: 3.94 M/UL (ref 4–5.2)
SODIUM SERPL-SCNC: 142 MMOL/L (ref 136–145)
WBC OTHER # BLD: 7.8 K/UL (ref 3.5–11)

## 2025-07-04 PROCEDURE — 83735 ASSAY OF MAGNESIUM: CPT

## 2025-07-04 PROCEDURE — 99232 SBSQ HOSP IP/OBS MODERATE 35: CPT | Performed by: INTERNAL MEDICINE

## 2025-07-04 PROCEDURE — 6370000000 HC RX 637 (ALT 250 FOR IP): Performed by: INTERNAL MEDICINE

## 2025-07-04 PROCEDURE — 6370000000 HC RX 637 (ALT 250 FOR IP)

## 2025-07-04 PROCEDURE — 80048 BASIC METABOLIC PNL TOTAL CA: CPT

## 2025-07-04 PROCEDURE — 6370000000 HC RX 637 (ALT 250 FOR IP): Performed by: NURSE PRACTITIONER

## 2025-07-04 PROCEDURE — 85025 COMPLETE CBC W/AUTO DIFF WBC: CPT

## 2025-07-04 PROCEDURE — 2500000003 HC RX 250 WO HCPCS: Performed by: STUDENT IN AN ORGANIZED HEALTH CARE EDUCATION/TRAINING PROGRAM

## 2025-07-04 PROCEDURE — 82947 ASSAY GLUCOSE BLOOD QUANT: CPT

## 2025-07-04 PROCEDURE — 2500000003 HC RX 250 WO HCPCS: Performed by: INTERNAL MEDICINE

## 2025-07-04 PROCEDURE — 1200000000 HC SEMI PRIVATE

## 2025-07-04 PROCEDURE — 36415 COLL VENOUS BLD VENIPUNCTURE: CPT

## 2025-07-04 RX ORDER — METOPROLOL TARTRATE 50 MG
50 TABLET ORAL 2 TIMES DAILY
Status: DISCONTINUED | OUTPATIENT
Start: 2025-07-04 | End: 2025-07-07 | Stop reason: HOSPADM

## 2025-07-04 RX ORDER — POTASSIUM CHLORIDE 7.45 MG/ML
10 INJECTION INTRAVENOUS PRN
Status: DISCONTINUED | OUTPATIENT
Start: 2025-07-04 | End: 2025-07-07 | Stop reason: HOSPADM

## 2025-07-04 RX ORDER — PANTOPRAZOLE SODIUM 40 MG/1
40 TABLET, DELAYED RELEASE ORAL
Status: DISCONTINUED | OUTPATIENT
Start: 2025-07-04 | End: 2025-07-07 | Stop reason: HOSPADM

## 2025-07-04 RX ORDER — POTASSIUM CHLORIDE 1500 MG/1
40 TABLET, EXTENDED RELEASE ORAL ONCE
Status: COMPLETED | OUTPATIENT
Start: 2025-07-04 | End: 2025-07-04

## 2025-07-04 RX ORDER — DIPHENHYDRAMINE HYDROCHLORIDE 50 MG/ML
25 INJECTION, SOLUTION INTRAMUSCULAR; INTRAVENOUS ONCE
Status: DISCONTINUED | OUTPATIENT
Start: 2025-07-04 | End: 2025-07-04

## 2025-07-04 RX ORDER — MAGNESIUM SULFATE IN WATER 40 MG/ML
2000 INJECTION, SOLUTION INTRAVENOUS ONCE
Status: DISCONTINUED | OUTPATIENT
Start: 2025-07-04 | End: 2025-07-04

## 2025-07-04 RX ORDER — MAGNESIUM SULFATE 1 G/100ML
1000 INJECTION INTRAVENOUS PRN
Status: DISCONTINUED | OUTPATIENT
Start: 2025-07-04 | End: 2025-07-04

## 2025-07-04 RX ORDER — VALSARTAN 40 MG/1
80 TABLET ORAL DAILY
Status: DISCONTINUED | OUTPATIENT
Start: 2025-07-04 | End: 2025-07-07

## 2025-07-04 RX ORDER — POTASSIUM CHLORIDE 1500 MG/1
40 TABLET, EXTENDED RELEASE ORAL PRN
Status: DISCONTINUED | OUTPATIENT
Start: 2025-07-04 | End: 2025-07-07 | Stop reason: HOSPADM

## 2025-07-04 RX ORDER — LANOLIN ALCOHOL/MO/W.PET/CERES
400 CREAM (GRAM) TOPICAL 2 TIMES DAILY
Status: COMPLETED | OUTPATIENT
Start: 2025-07-04 | End: 2025-07-05

## 2025-07-04 RX ORDER — DIPHENHYDRAMINE HCL 25 MG
25 TABLET ORAL NIGHTLY PRN
Status: DISCONTINUED | OUTPATIENT
Start: 2025-07-04 | End: 2025-07-07 | Stop reason: HOSPADM

## 2025-07-04 RX ADMIN — PANTOPRAZOLE SODIUM 40 MG: 40 TABLET, DELAYED RELEASE ORAL at 08:37

## 2025-07-04 RX ADMIN — METOROPROLOL TARTRATE 5 MG: 5 INJECTION, SOLUTION INTRAVENOUS at 01:44

## 2025-07-04 RX ADMIN — VALSARTAN 80 MG: 40 TABLET, FILM COATED ORAL at 08:28

## 2025-07-04 RX ADMIN — SODIUM CHLORIDE, PRESERVATIVE FREE 10 ML: 5 INJECTION INTRAVENOUS at 08:32

## 2025-07-04 RX ADMIN — INSULIN LISPRO 1 UNITS: 100 INJECTION, SOLUTION INTRAVENOUS; SUBCUTANEOUS at 06:56

## 2025-07-04 RX ADMIN — DIPHENHYDRAMINE HYDROCHLORIDE 25 MG: 25 TABLET ORAL at 22:33

## 2025-07-04 RX ADMIN — METOPROLOL TARTRATE 50 MG: 50 TABLET, FILM COATED ORAL at 08:28

## 2025-07-04 RX ADMIN — MAGNESIUM GLUCONATE 500 MG ORAL TABLET 400 MG: 500 TABLET ORAL at 21:58

## 2025-07-04 RX ADMIN — INSULIN LISPRO 2 UNITS: 100 INJECTION, SOLUTION INTRAVENOUS; SUBCUTANEOUS at 21:58

## 2025-07-04 RX ADMIN — METOPROLOL TARTRATE 50 MG: 50 TABLET, FILM COATED ORAL at 21:58

## 2025-07-04 RX ADMIN — MAGNESIUM GLUCONATE 500 MG ORAL TABLET 400 MG: 500 TABLET ORAL at 11:53

## 2025-07-04 RX ADMIN — INSULIN LISPRO 1 UNITS: 100 INJECTION, SOLUTION INTRAVENOUS; SUBCUTANEOUS at 11:53

## 2025-07-04 RX ADMIN — POTASSIUM CHLORIDE 40 MEQ: 1500 TABLET, EXTENDED RELEASE ORAL at 08:30

## 2025-07-04 NOTE — DISCHARGE INSTR - COC
Continuity of Care Form    Patient Name: Barbie Irby   :  1940  MRN:  [id number]    Admit date:  2025  Discharge date:  2025    Code Status Order: Full Code   Advance Directives:     Admitting Physician:  No admitting provider for patient encounter.  PCP: Harley Peralta MD    Discharging Nurse: Yesika JEREZ   Discharging Hospital Unit/Room#: 318/318-01  Discharging Unit Phone Number: [phone]    Emergency Contact:   Extended Emergency Contact Information  Primary Emergency Contact: nicholas riby  Home Phone: [phone]  Relation: Child    Past Surgical History:  No past surgical history on file.    Immunization History:   Immunization History   Administered Date(s) Administered    COVID-19, MODERNA, , (age 12y+), IM, 50mcg/0.5mL 10/30/2024    COVID-19, PFIZER Bivalent, DO NOT Dilute, (age 12y+), IM, 30 mcg/0.3 mL 02/10/2023    COVID-19, PFIZER PURPLE top, DILUTE for use, (age 12 y+), 30mcg/0.3mL 2021, 2021, 2021    COVID-19, PFIZER, , (age 12y+), IM, 30mcg/0.3mL 2023    Influenza, FLUAD, (age 65 y+), IM, Quadv, 0.5mL 10/18/2021, 10/07/2022, 2023    Influenza, FLUAD, (age 65 y+), IM, Trivalent PF, 0.5mL 10/09/2024    Influenza, FLUZONE High Dose (age 65 y+), IM, Quadv, 0.7mL 10/16/2020    Influenza, FLUZONE High Dose, (age 65 y+), IM, Trivalent PF, 0.5mL 2019    Pneumococcal, PCV-13, PREVNAR 13, (age 6w+), IM, 0.5mL 2018    Pneumococcal, PPSV23, PNEUMOVAX 23, (age 2y+), SC/IM, 0.5mL 2011    TDaP, ADACEL (age 10y-64y), BOOSTRIX (age 10y+), IM, 0.5mL 2019    Zoster Live (Zostavax) 2017       Active Problems:  Patient Active Problem List   Diagnosis Code    Blood per rectum K62.5    Hypertension I10    Type 2 diabetes mellitus (HCC) E11.9    Gastrointestinal hemorrhage K92.2    Anxiety F41.9    Arthritis M19.90    Irritable bowel syndrome with diarrhea K58.0    Mixed hyperlipidemia E78.2    Supraventricular premature  Status/Restrictions: No weight bearing restrictions  Other Medical Equipment (for information only, NOT a DME order):  bath bench  Other Treatments: none    Patient's personal belongings (please select all that are sent with patient):  None    RN SIGNATURE:  Electronically signed by Yesika Moran RN on 7/7/25 at 4:31 PM EDT    CASE MANAGEMENT/SOCIAL WORK SECTION    Inpatient Status Date: 06/28/2025    Readmission Risk Assessment Score:  [facility] RISK OF UNPLANNED READMISSION 2.0             9.4 Total Score        Discharging to Facility/ Agency   [facility]    / signature: Electronically signed by Elizabeth Oshea on 7/5/25 at 8:24 AM EDT    PHYSICIAN SECTION    Prognosis: Fair    Condition at Discharge: Stable    Rehab Potential (if transferring to Rehab): Fair    Recommended Labs or Other Treatments After Discharge: Follow-up with specialists as advised    Physician Certification: I certify the above information and transfer of Barbie Arevalo  is necessary for the continuing treatment of the diagnosis listed and that she requires Home Care for less 30 days.     Update Admission H&P: No change in H&P    PHYSICIAN SIGNATURE:  Electronically signed by Suzi Waggoner MD on 7/7/25 at 9:13 AM EDT

## 2025-07-04 NOTE — PROGRESS NOTES
[facility]  Office: [phone]  Curt Venegas, DO, Alberto Frye, DO, William Fiore DO, Lucho Panda, DO, Emily Carbone MD, Briana Velásquez MD, Mejia Trevino MD, Raissa Randolph MD,  Dilan Villasenor MD, Oxana Chau MD, Bertrand Millan MD,  Hank Beauchamp DO, Herminia Garzon MD, Ulisses Hicks MD, Shree Venegas DO, Chichi Lynch MD,  Jaden Bay DO, Erica Delgado MD, Farrah Johnson MD, Jim Randle MD,  Tanner Ivey MD, Eusebio Esparza MD, Geoffrey Jung MD, Amish Lundy MD, Lavell Ferreira MD, Hal Medrano MD, Olaf Regalado, DO, Suzi Waggoner MD, Harris Brush DO, Jewel Edmond MD, Hank Germain MD, Mohsin Reza, MD, Mike Dhaliwal MD, Shirley Waterhouse, CNP,  Becka Brown, CNP, Olaf Ragsdale, CNP,  Chikis Perry, RED, Ximena Phelps, CNP, Katey Woo, CNP, Tamiko Noel, CNP, Trish Goodman, CNP, Thereas Kraus, PA-C, Mikki Joshua, CNP, Vikram Davis, CNP,  Danielle Portillo, CNP, Jane Kaur, CNP, Jb Fairbanks, PA-C, Charity Escobar, PA-C, Katalina Henderson, CNP,        Sherita Amaya, CNS, Hailey Agarwal, CNP, Iram Smith, CNP         [facility]   IN-PATIENT SERVICE   [facility]    Progress Note    7/4/2025    7:26 AM    Name:   Barbie Arevalo  MRN:     [id number]     Acct:      [id number]   Room:   [facility] Day:  6  Admit Date:  6/28/2025 11:18 AM    PCP:   Harley Peralta MD  Code Status:  Full Code    Subjective:     C/C:   Chief Complaint   Patient presents with    Abdominal Cramping    Decreased appetite     Patient states on Thursday she started having increased decreased appetite, abd cramping. Patient states she was dx with a UTI put on antibiotics on June 12th, for UTI, went to [facility] ER on Monday, dx with UTI, given Cipro, then abd cramping, decreased appetite, increased weakness, and some \"memory issues\".      Extremity Weakness     Interval History Status:       Patient seen and examined reports feeling

## 2025-07-04 NOTE — PROGRESS NOTES
PATIENT NAME: Barbie Arevalo     TODAY'S DATE: 7/4/2025, 7:13 AM    SUBJECTIVE:      Patient was examined this a.m. No acute events overnight.  Patient appears well.  Patient continue to have paradoxical watery diarrhea in total x 7. Patient denies of having nausea or vomiting or bloating sensation, and she tolerated her clear liquid well.      OBJECTIVE:   VITALS:  BP (!) 167/65   Pulse 70   Temp 97.7 °F (36.5 °C) (Oral)   Resp 16   Ht 1.549 m (5' 0.98\")   Wt 70 kg (154 lb 5.2 oz)   SpO2 97%   BMI 29.17 kg/m²      INTAKE/OUTPUT:      Intake/Output Summary (Last 24 hours) at 7/4/2025 0713  Last data filed at 7/3/2025 2015  Gross per 24 hour   Intake 863.57 ml   Output --   Net 863.57 ml                 CONSTITUTIONAL:  awake and alert.  No acute distress.  Patient is good spirits.  Patient is not toxic or ill appearing.  ABDOMEN:   Abdomen soft and nontender. Patient has improved abdominal distention compared to yesterday.    Skin: warm well perfused  CV: RRR  Pulm: normal work of breathing on room air    Data:  CBC:   Lab Results   Component Value Date/Time    WBC 7.8 07/04/2025 06:30 AM    RBC 3.94 07/04/2025 06:30 AM    HGB 11.0 07/04/2025 06:30 AM    HCT 31.9 07/04/2025 06:30 AM    MCV 80.9 07/04/2025 06:30 AM    MCH 27.9 07/04/2025 06:30 AM    MCHC 34.5 07/04/2025 06:30 AM    RDW 15.2 07/04/2025 06:30 AM     07/04/2025 06:30 AM    MPV 7.1 07/04/2025 06:30 AM     Component  Ref Range & Units (hover) 6/29/25 0703 6/28/25 2117   Lactic Acid 0.9 1.2     FL SMALL BOWEL FOLLOW THROUGH ONLY  Result Date: 7/3/2025  1. Multiple dilated loops of small bowel.  Contrast material seen within the colon by the 1 hour image.  This could be due to partial small bowel obstruction.           ASSESSMENT   Small bowel obstruction  Hx colectomy  Hx DM     Plan  Advanced diet to full liquid diet, if patient has nausea or vomiting will change back to NPO. Okay to advance to regular diet this evening if

## 2025-07-04 NOTE — PLAN OF CARE
Problem: Chronic Conditions and Co-morbidities  Goal: Patient's chronic conditions and co-morbidity symptoms are monitored and maintained or improved  Outcome: Progressing  Flowsheets (Taken 7/4/2025 0828)  Care Plan - Patient's Chronic Conditions and Co-Morbidity Symptoms are Monitored and Maintained or Improved: Monitor and assess patient's chronic conditions and comorbid symptoms for stability, deterioration, or improvement     Problem: Discharge Planning  Goal: Discharge to home or other facility with appropriate resources  Outcome: Progressing  Flowsheets (Taken 7/4/2025 0828)  Discharge to home or other facility with appropriate resources: Identify barriers to discharge with patient and caregiver     Problem: Pain  Goal: Verbalizes/displays adequate comfort level or baseline comfort level  Outcome: Progressing     Problem: Safety - Adult  Goal: Free from fall injury  Outcome: Progressing     Problem: ABCDS Injury Assessment  Goal: Absence of physical injury  Outcome: Progressing     Problem: Nutrition Deficit:  Goal: Optimize nutritional status  Outcome: Progressing     Problem: Skin/Tissue Integrity  Goal: Skin integrity remains intact  Description: 1.  Monitor for areas of redness and/or skin breakdown  2.  Assess vascular access sites hourly  3.  Every 4-6 hours minimum:  Change oxygen saturation probe site  4.  Every 4-6 hours:  If on nasal continuous positive airway pressure, respiratory therapy assess nares and determine need for appliance change or resting period  Outcome: Progressing  Flowsheets (Taken 7/4/2025 0828)  Skin Integrity Remains Intact: Monitor for areas of redness and/or skin breakdown

## 2025-07-04 NOTE — PROGRESS NOTES
Spiritual Health History and Assessment/Progress Note  [facility]    (P) Follow-up, (P) Emotional distress, (P) Life Adjustments, Adjustment to illness,      Name: Barbie Arevalo MRN: [id number]    Age: 84 y.o.     Sex: female   Language: English   Buddhist: Other   SBO (small bowel obstruction) ([facility])     Date: 7/3/2025            Total Time Calculated: (P) 35 min              Spiritual Assessment    07/03/25 2005   Encounter Summary   Encounter Overview/Reason Follow-up   Service Provided For Patient   Referral/Consult From Nurse;Other    Support System Children   Last Encounter  07/03/25   Complexity of Encounter Moderate   Begin Time 1750   End Time  1825   Total Time Calculated 35 min   Crisis   Type Emotional distress   Spiritual/Emotional needs   Type Spiritual Support;Emotional Distress   Grief, Loss, and Adjustments   Type Life Adjustments;Adjustment to illness   Assessment/Intervention/Outcome   Assessment Anxious;Coping   Intervention Prayer (assurance of)/[place];Sustaining Presence/Ministry of presence;Discussed relationship with God;Explored/Affirmed feelings, thoughts, concerns;Discussed belief system/Pentecostalism practices/[place];Nurtured Hope;Read/Provided Scripture   Outcome Comfort;Coping;Encouraged;Engaged in conversation;Expressed feelings, needs, and concerns;Expressed Gratitude;Receptive   Plan and Referrals   Plan/Referrals Continue to visit, (comment)      in [address]        Referral/Consult From: (P) Nurse, Other    Encounter Overview/Reason: (P) Follow-up  Service Provided For: (P) Patient       visited Pt. As requested by nurse. Pt. Was a consult visit.  Pt. Was open to conversation and welcoming for answers about knowing God.  provided support and paraoral care. Active listening was offered and encouragement. the patient. Wanted to know how to know God and be sure of heaven.  provided support and spiritual  to this issue.  Gratitude;Receptive   Plan and Referrals   Plan/Referrals Continue to visit, (comment)

## 2025-07-04 NOTE — PROGRESS NOTES
Physical Therapy        Physical Therapy Cancel Note      DATE: 2025    NAME: Barbie Arevalo  MRN: [id number]   : 1940      Patient not seen this date for Physical Therapy due to:    Other: RN deferred, pt to have enema. Will continue to pursue PT as appropriate.      Electronically signed by Sanaz Rivas PT on 2025 at 1:42 PM

## 2025-07-05 ENCOUNTER — APPOINTMENT (OUTPATIENT)
Dept: GENERAL RADIOLOGY | Age: 85
DRG: 390 | End: 2025-07-05
Payer: MEDICARE

## 2025-07-05 PROBLEM — R19.7 DIARRHEA: Status: ACTIVE | Noted: 2024-06-25

## 2025-07-05 LAB
ANION GAP SERPL CALCULATED.3IONS-SCNC: 11 MMOL/L (ref 9–16)
BASOPHILS # BLD: 0 K/UL (ref 0–0.2)
BASOPHILS NFR BLD: 0 % (ref 0–2)
BUN SERPL-MCNC: 15 MG/DL (ref 8–23)
CALCIUM SERPL-MCNC: 9.1 MG/DL (ref 8.6–10.4)
CHLORIDE SERPL-SCNC: 102 MMOL/L (ref 98–107)
CO2 SERPL-SCNC: 28 MMOL/L (ref 20–31)
CREAT SERPL-MCNC: 0.9 MG/DL (ref 0.6–0.9)
EOSINOPHIL # BLD: 0.1 K/UL (ref 0–0.4)
EOSINOPHILS RELATIVE PERCENT: 2 % (ref 1–4)
ERYTHROCYTE [DISTWIDTH] IN BLOOD BY AUTOMATED COUNT: 15.6 % (ref 12.5–15.4)
EST. AVERAGE GLUCOSE BLD GHB EST-MCNC: 154 MG/DL
GFR, ESTIMATED: 63 ML/MIN/1.73M2
GLUCOSE BLD-MCNC: 205 MG/DL (ref 65–105)
GLUCOSE BLD-MCNC: 219 MG/DL (ref 65–105)
GLUCOSE BLD-MCNC: 234 MG/DL (ref 65–105)
GLUCOSE BLD-MCNC: 238 MG/DL (ref 65–105)
GLUCOSE BLD-MCNC: 244 MG/DL (ref 65–105)
GLUCOSE SERPL-MCNC: 188 MG/DL (ref 74–99)
HBA1C MFR BLD: 7 % (ref 4–6)
HCT VFR BLD AUTO: 35.8 % (ref 36–46)
HGB BLD-MCNC: 11.9 G/DL (ref 12–16)
LYMPHOCYTES NFR BLD: 0.9 K/UL (ref 1–4.8)
LYMPHOCYTES RELATIVE PERCENT: 12 % (ref 24–44)
MCH RBC QN AUTO: 27.2 PG (ref 26–34)
MCHC RBC AUTO-ENTMCNC: 33.2 G/DL (ref 31–37)
MCV RBC AUTO: 81.9 FL (ref 80–100)
MONOCYTES NFR BLD: 0.8 K/UL (ref 0.1–1.2)
MONOCYTES NFR BLD: 10 % (ref 2–11)
NEUTROPHILS NFR BLD: 76 % (ref 36–66)
NEUTS SEG NFR BLD: 5.9 K/UL (ref 1.8–7.7)
PLATELET # BLD AUTO: 267 K/UL (ref 140–450)
PMV BLD AUTO: 7 FL (ref 6–12)
POTASSIUM SERPL-SCNC: 3.7 MMOL/L (ref 3.7–5.3)
RBC # BLD AUTO: 4.37 M/UL (ref 4–5.2)
SODIUM SERPL-SCNC: 141 MMOL/L (ref 136–145)
WBC OTHER # BLD: 7.8 K/UL (ref 3.5–11)

## 2025-07-05 PROCEDURE — 82947 ASSAY GLUCOSE BLOOD QUANT: CPT

## 2025-07-05 PROCEDURE — 99223 1ST HOSP IP/OBS HIGH 75: CPT | Performed by: STUDENT IN AN ORGANIZED HEALTH CARE EDUCATION/TRAINING PROGRAM

## 2025-07-05 PROCEDURE — APPNB60 APP NON BILLABLE TIME 46-60 MINS: Performed by: NURSE PRACTITIONER

## 2025-07-05 PROCEDURE — 1200000000 HC SEMI PRIVATE

## 2025-07-05 PROCEDURE — 85025 COMPLETE CBC W/AUTO DIFF WBC: CPT

## 2025-07-05 PROCEDURE — 99232 SBSQ HOSP IP/OBS MODERATE 35: CPT | Performed by: INTERNAL MEDICINE

## 2025-07-05 PROCEDURE — 80048 BASIC METABOLIC PNL TOTAL CA: CPT

## 2025-07-05 PROCEDURE — 6370000000 HC RX 637 (ALT 250 FOR IP): Performed by: INTERNAL MEDICINE

## 2025-07-05 PROCEDURE — 81001 URINALYSIS AUTO W/SCOPE: CPT

## 2025-07-05 PROCEDURE — 6370000000 HC RX 637 (ALT 250 FOR IP): Performed by: NURSE PRACTITIONER

## 2025-07-05 PROCEDURE — 6370000000 HC RX 637 (ALT 250 FOR IP)

## 2025-07-05 PROCEDURE — 97535 SELF CARE MNGMENT TRAINING: CPT

## 2025-07-05 PROCEDURE — 74018 RADEX ABDOMEN 1 VIEW: CPT

## 2025-07-05 PROCEDURE — 83036 HEMOGLOBIN GLYCOSYLATED A1C: CPT

## 2025-07-05 PROCEDURE — 36415 COLL VENOUS BLD VENIPUNCTURE: CPT

## 2025-07-05 RX ORDER — INSULIN GLARGINE 100 [IU]/ML
5 INJECTION, SOLUTION SUBCUTANEOUS DAILY
Status: DISCONTINUED | OUTPATIENT
Start: 2025-07-05 | End: 2025-07-06

## 2025-07-05 RX ADMIN — INSULIN LISPRO 1 UNITS: 100 INJECTION, SOLUTION INTRAVENOUS; SUBCUTANEOUS at 22:10

## 2025-07-05 RX ADMIN — MAGNESIUM GLUCONATE 500 MG ORAL TABLET 400 MG: 500 TABLET ORAL at 22:09

## 2025-07-05 RX ADMIN — INSULIN LISPRO 1 UNITS: 100 INJECTION, SOLUTION INTRAVENOUS; SUBCUTANEOUS at 18:05

## 2025-07-05 RX ADMIN — PANTOPRAZOLE SODIUM 40 MG: 40 TABLET, DELAYED RELEASE ORAL at 06:38

## 2025-07-05 RX ADMIN — INSULIN LISPRO 1 UNITS: 100 INJECTION, SOLUTION INTRAVENOUS; SUBCUTANEOUS at 12:22

## 2025-07-05 RX ADMIN — DIPHENHYDRAMINE HYDROCHLORIDE 25 MG: 25 TABLET ORAL at 22:42

## 2025-07-05 RX ADMIN — INSULIN LISPRO 1 UNITS: 100 INJECTION, SOLUTION INTRAVENOUS; SUBCUTANEOUS at 06:38

## 2025-07-05 RX ADMIN — METOPROLOL TARTRATE 50 MG: 50 TABLET, FILM COATED ORAL at 12:21

## 2025-07-05 RX ADMIN — VALSARTAN 80 MG: 40 TABLET, FILM COATED ORAL at 12:22

## 2025-07-05 RX ADMIN — METOPROLOL TARTRATE 50 MG: 50 TABLET, FILM COATED ORAL at 22:10

## 2025-07-05 RX ADMIN — MAGNESIUM GLUCONATE 500 MG ORAL TABLET 400 MG: 500 TABLET ORAL at 10:03

## 2025-07-05 RX ADMIN — INSULIN GLARGINE 5 UNITS: 100 INJECTION, SOLUTION SUBCUTANEOUS at 10:05

## 2025-07-05 NOTE — PROGRESS NOTES
[facility]  Office: [phone]  Curt Venegas, DO, Alberto Frye, DO, William Fiore DO, Lucho Panda, DO, Emily Carbone MD, Briana Velásquez MD, Mejia Trevino MD, Raissa Randolph MD,  Dilan Villasenor MD, Oxana Chau MD, Bertrand Millan MD,  Hank Beauchamp DO, Herminia Garzon MD, Ulisses Hicks MD, Shree Venegas DO, Chichi Lynch MD,  Jaden Bay DO, Erica Delgado MD, Farrah Johnson MD, Jim Randle MD,  Tanner Ivey MD, Eusebio Esparza MD, Geoffrey Jung MD, Amish Lundy MD, Lavell Ferreira MD, Hal Medrano MD, Olaf Regalado, DO, Suzi Waggoner MD, Harris Brush DO, Jewel Edmond MD, Hank Germain MD, Mohsin Reza, MD, Mike Dhaliwal MD, Shirley Waterhouse, CNP,  Becka Brown, CNP, Olaf Ragsdale, CNP,  Chikis Perry, RED, Ximena Phelps, CNP, Katey Woo, CNP, Tamiko Noel, CNP, Trish Goodman, CNP, Theresa Kraus, PA-C, Mikki Joshua, CNP, Vikram Davis, CNP,  Danielle Portillo, CNP, Jane Kaur, CNP, Jb Fairbanks, PA-C, Charity Escobar, PA-C, Katalina Henderson, CNP,        Sherita Amaya, CNS, Hailey Agarwal, CNP, Iram Smith, CNP         [facility]   IN-PATIENT SERVICE   [facility]    Progress Note    7/5/2025    7:28 AM    Name:   Barbie Arevalo  MRN:     [id number]     Acct:      [id number]   Room:   [facility] Day:  7  Admit Date:  6/28/2025 11:18 AM    PCP:   Harley Peralta MD  Code Status:  Full Code    Subjective:     C/C:   Chief Complaint   Patient presents with    Abdominal Cramping    Decreased appetite     Patient states on Thursday she started having increased decreased appetite, abd cramping. Patient states she was dx with a UTI put on antibiotics on June 12th, for UTI, went to [facility] ER on Monday, dx with UTI, given Cipro, then abd cramping, decreased appetite, increased weakness, and some \"memory issues\".      Extremity Weakness     Interval History Status:     Patient seen and examined, has been placed on

## 2025-07-05 NOTE — PLAN OF CARE
Problem: Chronic Conditions and Co-morbidities  Goal: Patient's chronic conditions and co-morbidity symptoms are monitored and maintained or improved  Outcome: Progressing     Problem: Discharge Planning  Goal: Discharge to home or other facility with appropriate resources  Outcome: Progressing     Problem: Pain  Goal: Verbalizes/displays adequate comfort level or baseline comfort level  Outcome: Progressing     Problem: Safety - Adult  Goal: Free from fall injury  Outcome: Progressing     Problem: ABCDS Injury Assessment  Goal: Absence of physical injury  Outcome: Progressing     Problem: Nutrition Deficit:  Goal: Optimize nutritional status  Outcome: Progressing     Problem: Skin/Tissue Integrity  Goal: Skin integrity remains intact  Description: 1.  Monitor for areas of redness and/or skin breakdown  2.  Assess vascular access sites hourly  3.  Every 4-6 hours minimum:  Change oxygen saturation probe site  4.  Every 4-6 hours:  If on nasal continuous positive airway pressure, respiratory therapy assess nares and determine need for appliance change or resting period  Outcome: Progressing  Flowsheets (Taken 7/5/2025 6400)  Skin Integrity Remains Intact: Monitor for areas of redness and/or skin breakdown

## 2025-07-05 NOTE — CONSULTS
Gastroenterology Consult Note    Patient:   Barbie Arevalo   Admit date:  6/28/2025  Facility:   [facility]  Referring/PCP: Harley Peralta MD  Date:     7/5/2025  Consultant:   DOT Amador - ANTONIETA, Rafa Liriano MD    Subjective:     This 84 y.o. female was admitted 6/28/2025 with a diagnosis of \"Small bowel obstruction (HCC) [K56.609]  SBO (small bowel obstruction) (HCC) [K56.609]\" and is seen in consultation regarding   Chief Complaint   Patient presents with    Abdominal Cramping    Decreased appetite     Patient states on Thursday she started having increased decreased appetite, abd cramping. Patient states she was dx with a UTI put on antibiotics on June 12th, for UTI, went to [facility] ER on Monday, dx with UTI, given Cipro, then abd cramping, decreased appetite, increased weakness, and some \"memory issues\".      Extremity Weakness     84-year-old female with past medical history of partial colectomy for colon cancer 21 years ago, diabetes, hypertension initially presented to the ED on 6/28/2025 to the ED with cramping, decreased appetite, nausea and admitted with small bowel obstruction.  GI consulted for diarrhea.  On 6/28/2025 patient had CT abdomen pelvis that showed high grade small bowel obstruction with transition point seen in pelvis likely related to adhesions.  General surgery has been following.  SBFT on 7/3/2025 showed multiple dilated loops of small bowel.  Contrast material seen in the colon by 1 hour.  NG was removed on 7/3/2025.  Patient has tolerated diet.  Currently she is on a regular diet.  GI consulted for paradoxical diarrhea in chronic constipation. Patient reports that she has been having diarrhea since she had SBFT on Thursday.  Now she is having some fecal incontinence.  States she has never had this problem in the past.  Denies any melena, hematochezia.      Reports that this morning her bowel movements have improved. She is still having some

## 2025-07-05 NOTE — PLAN OF CARE
Problem: Chronic Conditions and Co-morbidities  Goal: Patient's chronic conditions and co-morbidity symptoms are monitored and maintained or improved  7/4/2025 2215 by Zollinger, Sheri, RN  Outcome: Progressing  7/4/2025 1949 by Layne Godfrey LPN  Outcome: Progressing  Flowsheets (Taken 7/4/2025 0828)  Care Plan - Patient's Chronic Conditions and Co-Morbidity Symptoms are Monitored and Maintained or Improved: Monitor and assess patient's chronic conditions and comorbid symptoms for stability, deterioration, or improvement     Problem: Discharge Planning  Goal: Discharge to home or other facility with appropriate resources  7/4/2025 2215 by Zollinger, Sheri, RN  Outcome: Progressing  7/4/2025 1949 by Layne Godfrey LPN  Outcome: Progressing  Flowsheets (Taken 7/4/2025 0828)  Discharge to home or other facility with appropriate resources: Identify barriers to discharge with patient and caregiver     Problem: Pain  Goal: Verbalizes/displays adequate comfort level or baseline comfort level  7/4/2025 2215 by Zollinger, Sheri, RN  Outcome: Progressing  7/4/2025 1949 by Layne Godfrey LPN  Outcome: Progressing     Problem: Safety - Adult  Goal: Free from fall injury  7/4/2025 2215 by Zollinger, Sheri, RN  Outcome: Progressing  7/4/2025 1949 by Layne Godfrey LPN  Outcome: Progressing     Problem: ABCDS Injury Assessment  Goal: Absence of physical injury  7/4/2025 2215 by Zollinger, Sheri, RN  Outcome: Progressing  7/4/2025 1949 by Layne Godfrey LPN  Outcome: Progressing     Problem: Nutrition Deficit:  Goal: Optimize nutritional status  7/4/2025 2215 by Zollinger, Sheri, RN  Outcome: Progressing  7/4/2025 1949 by Layne Godfrey LPN  Outcome: Progressing     Problem: Skin/Tissue Integrity  Goal: Skin integrity remains intact  Description: 1.  Monitor for areas of redness and/or skin breakdown  2.  Assess vascular access sites hourly  3.  Every 4-6 hours minimum:  Change oxygen saturation probe site  4.

## 2025-07-05 NOTE — PROGRESS NOTES
PATIENT NAME: Barbie Arevalo     TODAY'S DATE: 7/5/2025, 10:45 AM    SUBJECTIVE:    85 y/o female with resolving small bowel obstruction continues to have diarrhea.  Patient complains of continued incontinence of stool.  Patient is having loose stools unaware while laying in bed.  Denies abdominal pain.  Denies nausea or vomiting.  Denies fevers, chills, or sweats.       OBJECTIVE:   VITALS:  BP (!) 132/54   Pulse 75   Temp 98.6 °F (37 °C) (Oral)   Resp 18   Ht 1.549 m (5' 0.98\")   Wt 72 kg (158 lb 11.7 oz)   SpO2 94%   BMI 30.01 kg/m²      INTAKE/OUTPUT:      Intake/Output Summary (Last 24 hours) at 7/5/2025 1045  Last data filed at 7/5/2025 0630  Gross per 24 hour   Intake 560 ml   Output --   Net 560 ml                 CONSTITUTIONAL:  awake and alert.  No acute distress  ABDOMEN:   Abdomen soft with mild distention.  There is no tenderness on palpation.    Data:  CBC:   Lab Results   Component Value Date/Time    WBC 7.8 07/05/2025 06:30 AM    RBC 4.37 07/05/2025 06:30 AM    HGB 11.9 07/05/2025 06:30 AM    HCT 35.8 07/05/2025 06:30 AM    MCV 81.9 07/05/2025 06:30 AM    MCH 27.2 07/05/2025 06:30 AM    MCHC 33.2 07/05/2025 06:30 AM    RDW 15.6 07/05/2025 06:30 AM     07/05/2025 06:30 AM    MPV 7.0 07/05/2025 06:30 AM       ASSESSMENT   Small bowel obstruction resolving    Plan  GI consulted for evaluation of diarrhea and incontinence.   Patient continues bowel movements.  Small bowel obstruction resolving.  Patient does not have an acute surgical abdomen.  Continue regular diet.  Surgery will continue to follow.    Electronically signed by Kyle Humphreys IV, DO  on 7/5/2025 at 10:45 AM

## 2025-07-05 NOTE — PROGRESS NOTES
Occupational Therapy  Occupational Therapy Daily Treatment Note  Facility/Department: [address]   Patient Name: Barbie Arevalo        MRN: [id number]    : 1940    Date of Service: 2025    Chief Complaint   Patient presents with    Abdominal Cramping    Decreased appetite     Patient states on Thursday she started having increased decreased appetite, abd cramping. Patient states she was dx with a UTI put on antibiotics on , for UTI, went to [facility] ER on Monday, dx with UTI, given Cipro, then abd cramping, decreased appetite, increased weakness, and some \"memory issues\".      Extremity Weakness     Past Medical History:  has a past medical history of Diabetes 1.5, managed as type 2 (HCC) and Hypertension.  Past Surgical History:  has no past surgical history on file.    Discharge Recommendations  Discharge Recommendations: Patient would benefit from continued therapy after discharge    Assessment  Performance deficits / Impairments: Decreased functional mobility ;Decreased ADL status;Decreased safe awareness;Decreased cognition;Decreased endurance;Decreased balance;Decreased high-level IADLs  Assessment: Pt presents with above noted deficits impacting pt's ADL status requiring SBA for functional transfers/functional mobility with use of RW and SBA-CGA for engagement in ADL tasks. At baseline, pt performs ADL tasks independently and performs functional transfers/functional mobility with mod IND using cane. Pt to benefit from continued therapy services while hospitalized and at discharge to maximize pt's safety and independence in performing functional tasks.  Prognosis: Good  Decision Making: Medium Complexity  REQUIRES OT FOLLOW-UP: Yes  Activity Tolerance  Activity Tolerance: Patient Tolerated treatment well  Safety Devices  Type of Devices: Call light within reach;Nurse notified;Gait belt;Left in bed;Bed alarm in place  Restraints  Restraints Initially in Place:

## 2025-07-06 LAB
ANION GAP SERPL CALCULATED.3IONS-SCNC: 10 MMOL/L (ref 9–16)
BACTERIA URNS QL MICRO: ABNORMAL
BILIRUB UR QL STRIP: NEGATIVE
BUN SERPL-MCNC: 11 MG/DL (ref 8–23)
CALCIUM SERPL-MCNC: 8.7 MG/DL (ref 8.6–10.4)
CHLORIDE SERPL-SCNC: 103 MMOL/L (ref 98–107)
CLARITY UR: CLEAR
CO2 SERPL-SCNC: 27 MMOL/L (ref 20–31)
COLOR UR: YELLOW
CREAT SERPL-MCNC: 0.8 MG/DL (ref 0.6–0.9)
EPI CELLS #/AREA URNS HPF: ABNORMAL /HPF (ref 0–5)
GFR, ESTIMATED: 72 ML/MIN/1.73M2
GLUCOSE BLD-MCNC: 171 MG/DL (ref 65–105)
GLUCOSE BLD-MCNC: 181 MG/DL (ref 65–105)
GLUCOSE BLD-MCNC: 204 MG/DL (ref 65–105)
GLUCOSE BLD-MCNC: 275 MG/DL (ref 65–105)
GLUCOSE SERPL-MCNC: 179 MG/DL (ref 74–99)
GLUCOSE UR STRIP-MCNC: NEGATIVE MG/DL
HGB UR QL STRIP.AUTO: ABNORMAL
KETONES UR STRIP-MCNC: NEGATIVE MG/DL
LEUKOCYTE ESTERASE UR QL STRIP: ABNORMAL
NITRITE UR QL STRIP: NEGATIVE
PH UR STRIP: 6 [PH] (ref 5–8)
POTASSIUM SERPL-SCNC: 3.7 MMOL/L (ref 3.7–5.3)
PROT UR STRIP-MCNC: NEGATIVE MG/DL
RBC #/AREA URNS HPF: ABNORMAL /HPF (ref 0–2)
SODIUM SERPL-SCNC: 140 MMOL/L (ref 136–145)
SP GR UR STRIP: 1.02 (ref 1–1.03)
UROBILINOGEN UR STRIP-ACNC: NORMAL EU/DL (ref 0–1)
WBC #/AREA URNS HPF: ABNORMAL /HPF (ref 0–5)

## 2025-07-06 PROCEDURE — 1200000000 HC SEMI PRIVATE

## 2025-07-06 PROCEDURE — 6370000000 HC RX 637 (ALT 250 FOR IP): Performed by: INTERNAL MEDICINE

## 2025-07-06 PROCEDURE — 6370000000 HC RX 637 (ALT 250 FOR IP)

## 2025-07-06 PROCEDURE — APPSS30 APP SPLIT SHARED TIME 16-30 MINUTES: Performed by: NURSE PRACTITIONER

## 2025-07-06 PROCEDURE — 80048 BASIC METABOLIC PNL TOTAL CA: CPT

## 2025-07-06 PROCEDURE — 99232 SBSQ HOSP IP/OBS MODERATE 35: CPT | Performed by: INTERNAL MEDICINE

## 2025-07-06 PROCEDURE — 97110 THERAPEUTIC EXERCISES: CPT

## 2025-07-06 PROCEDURE — 36415 COLL VENOUS BLD VENIPUNCTURE: CPT

## 2025-07-06 PROCEDURE — 6370000000 HC RX 637 (ALT 250 FOR IP): Performed by: NURSE PRACTITIONER

## 2025-07-06 PROCEDURE — 82947 ASSAY GLUCOSE BLOOD QUANT: CPT

## 2025-07-06 PROCEDURE — 99233 SBSQ HOSP IP/OBS HIGH 50: CPT | Performed by: STUDENT IN AN ORGANIZED HEALTH CARE EDUCATION/TRAINING PROGRAM

## 2025-07-06 PROCEDURE — 97116 GAIT TRAINING THERAPY: CPT

## 2025-07-06 RX ORDER — INSULIN GLARGINE 100 [IU]/ML
8 INJECTION, SOLUTION SUBCUTANEOUS DAILY
Status: DISCONTINUED | OUTPATIENT
Start: 2025-07-07 | End: 2025-07-07 | Stop reason: HOSPADM

## 2025-07-06 RX ADMIN — INSULIN LISPRO 2 UNITS: 100 INJECTION, SOLUTION INTRAVENOUS; SUBCUTANEOUS at 20:43

## 2025-07-06 RX ADMIN — METOPROLOL TARTRATE 50 MG: 50 TABLET, FILM COATED ORAL at 20:43

## 2025-07-06 RX ADMIN — PSYLLIUM HUSK 1 PACKET: 3.4 POWDER ORAL at 08:13

## 2025-07-06 RX ADMIN — VALSARTAN 80 MG: 40 TABLET, FILM COATED ORAL at 08:13

## 2025-07-06 RX ADMIN — INSULIN LISPRO 1 UNITS: 100 INJECTION, SOLUTION INTRAVENOUS; SUBCUTANEOUS at 06:23

## 2025-07-06 RX ADMIN — PSYLLIUM HUSK 1 PACKET: 3.4 POWDER ORAL at 20:43

## 2025-07-06 RX ADMIN — INSULIN LISPRO 1 UNITS: 100 INJECTION, SOLUTION INTRAVENOUS; SUBCUTANEOUS at 11:48

## 2025-07-06 RX ADMIN — INSULIN GLARGINE 5 UNITS: 100 INJECTION, SOLUTION SUBCUTANEOUS at 08:16

## 2025-07-06 RX ADMIN — DIPHENHYDRAMINE HYDROCHLORIDE 25 MG: 25 TABLET ORAL at 22:08

## 2025-07-06 RX ADMIN — METOPROLOL TARTRATE 50 MG: 50 TABLET, FILM COATED ORAL at 08:13

## 2025-07-06 RX ADMIN — PANTOPRAZOLE SODIUM 40 MG: 40 TABLET, DELAYED RELEASE ORAL at 06:24

## 2025-07-06 NOTE — PROGRESS NOTES
[facility]  Office: [phone]  Curt Venegas, DO, Alberto Frye, DO, William Fiore DO, Lucho Panda, DO, Emily Carbone MD, Briana Velásquez MD, Mejia Trevino MD, Raissa Randolph MD,  Dilan Villasenor MD, Oxana Chau MD, Bertrand Millan MD,  Hank Beauchamp DO, Herminia Garzon MD, Ulisses Hicks MD, Shree Venegas DO, Chichi Lynch MD,  Jaden Bay DO, Erica Delgado MD, Farrah Johnson MD, Jim Randle MD,  Tanner Ivey MD, Eusebio Esparza MD, Geoffrey Jung MD, Amish Lundy MD, Lavell Ferreira MD, Hal Medrano MD, Olaf Regalado, DO, Suzi Waggoner MD, Harris Brush DO, Jewel Edmond MD, Hank Germain MD, Mohsin Reza, MD, Mike Dhaliwal MD, Shirley Waterhouse, CNP,  Becka Brown, CNP, Olaf Ragsdale, CNP,  Chikis Perry, RED, Ximena Phelps, CNP, Katey Woo, CNP, Tamiko Noel, CNP, Trish Goodman, CNP, Theresa Kraus, PA-C, Mikki Joshua, CNP, Vikram Davis, CNP,  Danielle Portillo, CNP, Jane Kaur, CNP, Jb Fairbanks, PA-C, Charity Escobar, PA-C, Katalina Henderson, CNP,        Sherita Amaya, CNS, Hailey Agarwal, CNP, Iram Smith, CNP         [facility]   IN-PATIENT SERVICE   [facility]    Progress Note    7/6/2025    9:43 AM    Name:   Barbie Arevalo  MRN:     [id number]     Acct:      [id number]   Room:   [address] [place] [address] Day:  8  Admit Date:  6/28/2025 11:18 AM    PCP:   Harley Peralta MD  Code Status:  Full Code    Subjective:     C/C:   Chief Complaint   Patient presents with    Abdominal Cramping    Decreased appetite     Patient states on Thursday she started having increased decreased appetite, abd cramping. Patient states she was dx with a UTI put on antibiotics on June 12th, for UTI, went to [facility] ER on Monday, dx with UTI, given Cipro, then abd cramping, decreased appetite, increased weakness, and some \"memory issues\".      Extremity Weakness     Interval History Status:     Patient seen and examined, tolerating regular

## 2025-07-06 NOTE — PROGRESS NOTES
PATIENT NAME: Barbie Arevalo     TODAY'S DATE: 7/6/2025, 10:29 AM    SUBJECTIVE:    85 y/o female with resolving small bowel obstruction doing well today.  Patient has had significantly less episodes of incontinence.  Patient states that she was incontinent of stool throughout the day on Friday.  Patient only recounts 2-3 episodes of stool incontinence yesterday.  Tolerating regular diet with no nausea or vomiting.  Denies fevers, chills, or sweats.  Denies abdominal pain.  Patient states that abdominal distention is back to her baseline.       OBJECTIVE:   VITALS:  BP (!) 170/74   Pulse 78   Temp 97.5 °F (36.4 °C)   Resp 18   Ht 1.549 m (5' 0.98\")   Wt 74.2 kg (163 lb 9.3 oz)   SpO2 96%   BMI 30.92 kg/m²      INTAKE/OUTPUT:      Intake/Output Summary (Last 24 hours) at 7/6/2025 1029  Last data filed at 7/6/2025 0417  Gross per 24 hour   Intake --   Output 250 ml   Net -250 ml                 CONSTITUTIONAL:  awake and alert.  No acute distress  ABDOMEN:   Abdomen soft, non-tender, non-distended.  Abdomen nondistended per patient.      Data:  CBC:   Lab Results   Component Value Date/Time    WBC 7.8 07/05/2025 06:30 AM    RBC 4.37 07/05/2025 06:30 AM    HGB 11.9 07/05/2025 06:30 AM    HCT 35.8 07/05/2025 06:30 AM    MCV 81.9 07/05/2025 06:30 AM    MCH 27.2 07/05/2025 06:30 AM    MCHC 33.2 07/05/2025 06:30 AM    RDW 15.6 07/05/2025 06:30 AM     07/05/2025 06:30 AM    MPV 7.0 07/05/2025 06:30 AM       ASSESSMENT   Small bowel obstruction resolving  Stool incontinence    Plan  Metamucil added per GI recs.  Stool studies pending.  Patient's symptoms are challenging because has longstanding hx of intermittent constipation and diarrhea at home.  Patient is not ill appearing.  Patient has a benign abdominal exam.  Patient does not have an acute surgical abdomen.  At this time surgery has no recommendations for patient's stool incontinence.      Electronically signed by Kyle Humphreys IV,

## 2025-07-06 NOTE — PLAN OF CARE
Problem: Chronic Conditions and Co-morbidities  Goal: Patient's chronic conditions and co-morbidity symptoms are monitored and maintained or improved  Outcome: Progressing  Flowsheets (Taken 7/6/2025 0816)  Care Plan - Patient's Chronic Conditions and Co-Morbidity Symptoms are Monitored and Maintained or Improved: Monitor and assess patient's chronic conditions and comorbid symptoms for stability, deterioration, or improvement     Problem: Discharge Planning  Goal: Discharge to home or other facility with appropriate resources  Outcome: Progressing  Flowsheets (Taken 7/6/2025 0816)  Discharge to home or other facility with appropriate resources: Identify barriers to discharge with patient and caregiver     Problem: Pain  Goal: Verbalizes/displays adequate comfort level or baseline comfort level  Outcome: Progressing     Problem: Safety - Adult  Goal: Free from fall injury  Outcome: Progressing     Problem: ABCDS Injury Assessment  Goal: Absence of physical injury  Outcome: Progressing     Problem: Nutrition Deficit:  Goal: Optimize nutritional status  Outcome: Progressing     Problem: Skin/Tissue Integrity  Goal: Skin integrity remains intact  Description: 1.  Monitor for areas of redness and/or skin breakdown  2.  Assess vascular access sites hourly  3.  Every 4-6 hours minimum:  Change oxygen saturation probe site  4.  Every 4-6 hours:  If on nasal continuous positive airway pressure, respiratory therapy assess nares and determine need for appliance change or resting period  Outcome: Progressing  Flowsheets  Taken 7/6/2025 1106  Skin Integrity Remains Intact: Monitor for areas of redness and/or skin breakdown  Taken 7/6/2025 0816  Skin Integrity Remains Intact: Monitor for areas of redness and/or skin breakdown

## 2025-07-06 NOTE — PROGRESS NOTES
Physical Therapy  Facility/Department: [address]  Physical Therapy Daily Treatment Note    Name: Barbie Arevalo  : 1940  MRN: [id number]  Date of Service: 2025  Chief Complaint   Patient presents with    Abdominal Cramping    Decreased appetite     Patient states on Thursday she started having increased decreased appetite, abd cramping. Patient states she was dx with a UTI put on antibiotics on , for UTI, went to [facility] ER on Monday, dx with UTI, given Cipro, then abd cramping, decreased appetite, increased weakness, and some \"memory issues\".      Extremity Weakness      Discharge Recommendations:  No therapy recommended at discharge   PT Equipment Recommendations  Equipment Needed: Yes  Mobility Devices: Walker  Walker: Rolling      Patient Diagnosis(es): The encounter diagnosis was Small bowel obstruction (HCC).  Past Medical History:  has a past medical history of Diabetes 1.5, managed as type 2 (HCC) and Hypertension.  Past Surgical History:  has no past surgical history on file.    Assessment  Body Structures, Functions, Activity Limitations Requiring Skilled Therapeutic Intervention: Decreased functional mobility ;Decreased safe awareness  Assessment: The patient was admitted due to SBO with a hx of colectomy 21 years ago. At baseline, the patient lives alone and requires no assistance with ADLs and functional mobility. The patient is modified independent with transfers using RW and supervision for ambulation with RW x 100 ft then 20ft x 2. Tolerated simple seated BLE ex this session. Pt denies having pain/lightheadedness/dizziness. The patient would benefit from acute PT to improve functional independence back to baseline. The patient would be appropriate to return to prior living arrangements with prior assistance as needed. She reports having good family support if assistance if needed after discharge.  Therapy Prognosis: Good  Decision Making: Low Complexity  Requires

## 2025-07-06 NOTE — PROGRESS NOTES
Spiritual Health History and Assessment/Progress Note  [facility]    (P) Follow-up, Emotional distress, (P) Life Adjustments, Adjustment to illness, Anticipatory Grief,      Name: Barbie Arevalo MRN: [id number]    Age: 84 y.o.     Sex: female   Language: English   [employer]: Other   SBO (small bowel obstruction) ([facility])     Date: 7/5/2025            Total Time Calculated: (P) 20 min              Spiritual Assessment continued in [facility] [place]        Referral/Consult From: (P) Rounding   Encounter Overview/Reason: (P) Follow-up  Service Provided For: (P) Patient       followed up on Pt on floor. Pt. Was welcoming and open to discussion about spiritual matters. Pt. Was feeling better and had family visit today. provided support and care. Active listening was offered.  invited Pt. To express feelings about spiritual journey. Good visit.    Annika, Belief, Meaning:   Patient has beliefs or practices that help with coping during difficult times  Family/Friends No family/friends present      Importance and Influence:  Patient has spiritual/personal beliefs that influence decisions regarding their health  Family/Friends No family/friends present    Community:  Patient feels well-supported. Support system includes: Children  Family/Friends No family/friends present    Assessment and Plan of Care:     Patient Interventions include: Facilitated expression of thoughts and feelings and Explored spiritual coping/struggle/distress  Family/Friends Interventions include: No family/friends present    Patient Plan of Care: Spiritual Care available upon further referral  Family/Friends Plan of Care: No family/friends present    Electronically signed by Chaplain TREVOR on 7/5/2025 at 8:33 PM    07/05/25 2031   Encounter Summary   Encounter Overview/Reason Follow-up   Service Provided For Patient   Referral/Consult From [facility]   Support System Children   Last Encounter  07/05/25

## 2025-07-06 NOTE — PROGRESS NOTES
of Transportation (Medical): No     Lack of Transportation (Non-Medical): No   Physical Activity: Not on file   Stress: Not on file   Social Connections: Not on file   Intimate Partner Violence: Not on file   Housing Stability: Low Risk  (2025)    Housing Stability Vital Sign     Unable to Pay for Housing in the Last Year: No     Number of Times Moved in the Last Year: 0     Homeless in the Last Year: No       Vitals:  BP (!) 153/71   Pulse 74   Temp 98.4 °F (36.9 °C) (Oral)   Resp 16   Ht 1.549 m (5' 0.98\")   Wt 74.2 kg (163 lb 9.3 oz)   SpO2 93%   BMI 30.92 kg/m²   Temp (24hrs), Av.5 °F (36.9 °C), Min:98.4 °F (36.9 °C), Max:98.6 °F (37 °C)    Recent Labs     25  1038 25  1536 25  1942 25  2149   POCGLU 238* 234* 244* 219*       I/O (24Hr):    Intake/Output Summary (Last 24 hours) at 2025 0548  Last data filed at 2025 0417  Gross per 24 hour   Intake 120 ml   Output 250 ml   Net -130 ml       Labs:    LABORATORY DATA: Reviewed  CBC:  Recent Labs     25  0653 25  0630 25  0630   WBC 9.1 7.8 7.8   HGB 11.3* 11.0* 11.9*   MCV 81.8 80.9 81.9   RDW 15.4 15.2 15.6*    223 267       ANEMIA STUDIES:  No results for input(s): \"TIBC\", \"FERRITIN\", \"FFQWDLLE54\", \"FOLATE\", \"OCCULTBLD\" in the last 72 hours.    Invalid input(s): \"LABIRON\"    BMP:  Recent Labs     25  0653 25  0630 25  0630    142 141   K 4.0 3.4* 3.7    105 102   CO2 24 26 28   BUN 15 18 15   CREATININE 0.9 0.8 0.9   GLUCOSE 169* 174* 188*   CALCIUM 9.2 9.0 9.1   MG  --  1.5*  --        LFTS:  No results for input(s): \"ALKPHOS\", \"ALT\", \"AST\", \"BILITOT\", \"BILIDIR\", \"LABALBU\" in the last 72 hours.    Amylase/Lipase and Ammonia:  No results for input(s): \"AMYLASE\", \"LIPASE\", \"AMMONIA\" in the last 72 hours.    Acute Hepatitis Panel:  No results found for: \"HEPBSAG\", \"HEPCAB\", \"HEPBIGM\", \"HEPAIGM\"    HCV Genotype:  No components found for: \"HEPATITISCGENOTYPE\"    HCV  colon.  A few residual sigmoid diverticuli. Appendix: Not seen Urinary bladder: Unremarkable Free fluid/air: None Lymph nodes: No enlarged lymph nodes Osseus structures: No destructive lesion Vasculature: No aneurysm Other: The uterus is surgically absent.     High-grade small bowel obstruction with transition point seen in the pelvis likely related to adhesions.  No evidence of pneumatosis or free air. Recommend surgical consultation.        Physical Examination:        General appearance: alert, cooperative and no distress  Mental Status: oriented to person, place and time and normal affect  Lungs: clear to auscultation bilaterally, normal effort  Heart: regular rate and rhythm, no murmur,  Abdomen: soft, nontender, nondistended, bowel sounds present all four quadrants, no masses, hepatomegaly or splenomegaly  Extremities: no edema, redness or tenderness in the calves  Skin: no gross lesions, rashes, or induration    Assessment:        Primary Problem  SBO (small bowel obstruction) (HCC)     Active Hospital Problems    Diagnosis Date Noted    SBO (small bowel obstruction) ([facility]) [K56.609] 06/28/2025    Hypertension [I10] 06/25/2024    Mixed hyperlipidemia [E78.2] 06/25/2024    Type 2 diabetes mellitus (HCC) [E11.9] 06/25/2024    Supraventricular premature beats [I49.1] 06/25/2024    Diarrhea [R19.7] 06/25/2024     Past Medical History:   Diagnosis Date    Diabetes 1.5, managed as type 2 (HCC)     Hypertension       SBO- resolved   Hx of colon cancer s/p resection 2004.  Diarrhea, fecal incontinence  Plan:        Stool tests for infectious etiology - no reported BM.  + flatus.  Start Metamucil  Diet as per surgery- cleared for regular diet  Supportive care     This plan was formulated in collaboration with Dr. Liriano    Explained to the patient and d/W Nursing Staff  Will F/U with you  Please call or Page for any issues or change in status  Thanks    This note is created with the assistance of the speech

## 2025-07-06 NOTE — PLAN OF CARE
Problem: Discharge Planning  Goal: Discharge to home or other facility with appropriate resources  7/6/2025 0028 by Carla Espinoza LPN  Outcome: Progressing     Problem: Pain  Goal: Verbalizes/displays adequate comfort level or baseline comfort level  7/6/2025 0028 by Carla Espinoza LPN  Outcome: Progressing     Problem: Safety - Adult  Goal: Free from fall injury  7/6/2025 0028 by Carla Espinoza LPN  Outcome: Progressing

## 2025-07-07 VITALS
DIASTOLIC BLOOD PRESSURE: 70 MMHG | RESPIRATION RATE: 24 BRPM | BODY MASS INDEX: 30.8 KG/M2 | SYSTOLIC BLOOD PRESSURE: 170 MMHG | HEART RATE: 64 BPM | WEIGHT: 163.14 LBS | OXYGEN SATURATION: 95 % | TEMPERATURE: 97.5 F | HEIGHT: 61 IN

## 2025-07-07 PROBLEM — K56.609 SBO (SMALL BOWEL OBSTRUCTION) (HCC): Status: ACTIVE | Noted: 2025-07-07

## 2025-07-07 LAB
ANION GAP SERPL CALCULATED.3IONS-SCNC: 8 MMOL/L (ref 9–16)
BUN SERPL-MCNC: 11 MG/DL (ref 8–23)
CALCIUM SERPL-MCNC: 8.8 MG/DL (ref 8.6–10.4)
CHLORIDE SERPL-SCNC: 102 MMOL/L (ref 98–107)
CO2 SERPL-SCNC: 29 MMOL/L (ref 20–31)
CREAT SERPL-MCNC: 0.8 MG/DL (ref 0.6–0.9)
GFR, ESTIMATED: 72 ML/MIN/1.73M2
GLUCOSE BLD-MCNC: 201 MG/DL (ref 65–105)
GLUCOSE BLD-MCNC: 282 MG/DL (ref 65–105)
GLUCOSE SERPL-MCNC: 185 MG/DL (ref 74–99)
POTASSIUM SERPL-SCNC: 3.9 MMOL/L (ref 3.7–5.3)
SODIUM SERPL-SCNC: 139 MMOL/L (ref 136–145)

## 2025-07-07 PROCEDURE — 87506 IADNA-DNA/RNA PROBE TQ 6-11: CPT

## 2025-07-07 PROCEDURE — 97116 GAIT TRAINING THERAPY: CPT

## 2025-07-07 PROCEDURE — 6370000000 HC RX 637 (ALT 250 FOR IP): Performed by: STUDENT IN AN ORGANIZED HEALTH CARE EDUCATION/TRAINING PROGRAM

## 2025-07-07 PROCEDURE — 6370000000 HC RX 637 (ALT 250 FOR IP)

## 2025-07-07 PROCEDURE — 6370000000 HC RX 637 (ALT 250 FOR IP): Performed by: INTERNAL MEDICINE

## 2025-07-07 PROCEDURE — 97535 SELF CARE MNGMENT TRAINING: CPT

## 2025-07-07 PROCEDURE — 80048 BASIC METABOLIC PNL TOTAL CA: CPT

## 2025-07-07 PROCEDURE — 82947 ASSAY GLUCOSE BLOOD QUANT: CPT

## 2025-07-07 PROCEDURE — 97530 THERAPEUTIC ACTIVITIES: CPT

## 2025-07-07 PROCEDURE — 99232 SBSQ HOSP IP/OBS MODERATE 35: CPT | Performed by: INTERNAL MEDICINE

## 2025-07-07 PROCEDURE — 97110 THERAPEUTIC EXERCISES: CPT

## 2025-07-07 PROCEDURE — 99231 SBSQ HOSP IP/OBS SF/LOW 25: CPT | Performed by: NURSE PRACTITIONER

## 2025-07-07 PROCEDURE — 36415 COLL VENOUS BLD VENIPUNCTURE: CPT

## 2025-07-07 RX ORDER — POLYETHYLENE GLYCOL 3350 17 G/17G
17 POWDER, FOR SOLUTION ORAL DAILY
Status: DISCONTINUED | OUTPATIENT
Start: 2025-07-07 | End: 2025-07-07 | Stop reason: HOSPADM

## 2025-07-07 RX ORDER — DOCUSATE SODIUM 100 MG/1
100 CAPSULE, LIQUID FILLED ORAL DAILY
Status: DISCONTINUED | OUTPATIENT
Start: 2025-07-07 | End: 2025-07-07 | Stop reason: HOSPADM

## 2025-07-07 RX ORDER — VALSARTAN 160 MG/1
160 TABLET ORAL DAILY
Status: DISCONTINUED | OUTPATIENT
Start: 2025-07-08 | End: 2025-07-07 | Stop reason: HOSPADM

## 2025-07-07 RX ORDER — PANTOPRAZOLE SODIUM 40 MG/1
40 TABLET, DELAYED RELEASE ORAL
Qty: 30 TABLET | Refills: 3 | Status: SHIPPED | OUTPATIENT
Start: 2025-07-08

## 2025-07-07 RX ORDER — PSEUDOEPHEDRINE HCL 30 MG
100 TABLET ORAL DAILY
Qty: 30 CAPSULE | Refills: 1 | Status: SHIPPED | OUTPATIENT
Start: 2025-07-07

## 2025-07-07 RX ADMIN — INSULIN LISPRO 2 UNITS: 100 INJECTION, SOLUTION INTRAVENOUS; SUBCUTANEOUS at 14:05

## 2025-07-07 RX ADMIN — INSULIN LISPRO 1 UNITS: 100 INJECTION, SOLUTION INTRAVENOUS; SUBCUTANEOUS at 06:15

## 2025-07-07 RX ADMIN — PANTOPRAZOLE SODIUM 40 MG: 40 TABLET, DELAYED RELEASE ORAL at 06:15

## 2025-07-07 RX ADMIN — DOCUSATE SODIUM 100 MG: 100 CAPSULE, LIQUID FILLED ORAL at 17:26

## 2025-07-07 RX ADMIN — METOPROLOL TARTRATE 50 MG: 50 TABLET, FILM COATED ORAL at 08:26

## 2025-07-07 RX ADMIN — POLYETHYLENE GLYCOL 3350 17 G: 17 POWDER, FOR SOLUTION ORAL at 08:25

## 2025-07-07 RX ADMIN — INSULIN GLARGINE 8 UNITS: 100 INJECTION, SOLUTION SUBCUTANEOUS at 09:00

## 2025-07-07 RX ADMIN — VALSARTAN 80 MG: 40 TABLET, FILM COATED ORAL at 08:26

## 2025-07-07 NOTE — PLAN OF CARE
Problem: Chronic Conditions and Co-morbidities  Goal: Patient's chronic conditions and co-morbidity symptoms are monitored and maintained or improved  Outcome: Progressing     Problem: Discharge Planning  Goal: Discharge to home or other facility with appropriate resources  Outcome: Progressing     Problem: Pain  Goal: Verbalizes/displays adequate comfort level or baseline comfort level  Outcome: Progressing     Problem: Safety - Adult  Goal: Free from fall injury  Outcome: Progressing     Problem: ABCDS Injury Assessment  Goal: Absence of physical injury  Outcome: Progressing     Problem: Nutrition Deficit:  Goal: Optimize nutritional status  Outcome: Progressing     Problem: Skin/Tissue Integrity  Goal: Skin integrity remains intact  Description: 1.  Monitor for areas of redness and/or skin breakdown  2.  Assess vascular access sites hourly  3.  Every 4-6 hours minimum:  Change oxygen saturation probe site  4.  Every 4-6 hours:  If on nasal continuous positive airway pressure, respiratory therapy assess nares and determine need for appliance change or resting period  Outcome: Progressing

## 2025-07-07 NOTE — PROGRESS NOTES
PATIENT NAME: Barbie Arevalo     TODAY'S DATE: 7/7/2025, 8:26 AM    SUBJECTIVE:    Barbie ESPINOSA Is an 83 y/o F with CC of SBO. The patient was evaluated at bedside and reports that she hasn't had any stool output in the past two days. The patient was previously passing small stool. She reports that she is still passing gas, but it is slightly decreased compared to the past few days. The patient reports no ABD pain/tenderness, nausea, vomiting, or fever/chills. Per nursing report the patient has been stable with GI and IM on board. The patient is tolerating regular diet, but reports her appetite is decreased. All questions and concerns were addressed with the patient. Patient is agreeable to POC      OBJECTIVE:   VITALS:  BP (!) 170/70   Pulse 64   Temp 97.5 °F (36.4 °C) (Oral)   Resp 24   Ht 1.549 m (5' 0.98\")   Wt 74 kg (163 lb 2.3 oz)   SpO2 95%   BMI 30.84 kg/m²      INTAKE/OUTPUT:      Intake/Output Summary (Last 24 hours) at 7/7/2025 0826  Last data filed at 7/6/2025 2208  Gross per 24 hour   Intake 120 ml   Output 300 ml   Net -180 ml                 CONSTITUTIONAL:  awake and alert.  No acute distress  ABDOMEN:   Abdomen soft, non-tender, non-distended.  Abdomen nondistended per patient and reports passing gas.      Data:  CBC:   Lab Results   Component Value Date/Time    WBC 7.8 07/05/2025 06:30 AM    RBC 4.37 07/05/2025 06:30 AM    HGB 11.9 07/05/2025 06:30 AM    HCT 35.8 07/05/2025 06:30 AM    MCV 81.9 07/05/2025 06:30 AM    MCH 27.2 07/05/2025 06:30 AM    MCHC 33.2 07/05/2025 06:30 AM    RDW 15.6 07/05/2025 06:30 AM     07/05/2025 06:30 AM    MPV 7.0 07/05/2025 06:30 AM       ASSESSMENT   Small bowel obstruction worsening    Plan  Regular diet as tolerated  Patient continues to have chronic constipation with intermittent overflow diarrhea.  Patient has not yet have bowel movement 2 days, defer management per GI  GI added glycolax as needed, and Metamucil daily.  Strict record

## 2025-07-07 NOTE — CARE COORDINATION
CM spoke with patient for DME- offered choices, requests referral to [employer],  family can  walker upon discharge.,  referral sent.    1139 CM spoke with Maritza @ [employer] order received , branch will reach out to patient regarding  order .     1500 Cm  spoke with Rossana @ [employer]  [phone] ordered received will reach out to patient for pick and verification of insurance.    IMM letter provided to patient.  Patient offered four hours to make informed decision regarding appeal process; patient agreeable to discharge.       1648 CM notified [facility] of discharge through [facility]port..

## 2025-07-07 NOTE — PROGRESS NOTES
Patient discharge reviewed, pt verbalized understanding. Pt wheeled to private vehicle. LAM[place] 1800

## 2025-07-07 NOTE — DISCHARGE INSTRUCTIONS
You are being advised for constipation relief with Miralax, metamucil, and colace. Each medication can be taken daily. If you notice loose stool, decrease usage of miralax. IF you notice continued loose stool after decreasing usage, decrease metamucil OR colace. You will need to take at least one medication to help with constipation.

## 2025-07-07 NOTE — PROGRESS NOTES
Barbie Arevalo was evaluated today and a DME order was entered for a wheeled walker because she requires this to successfully complete daily living tasks of personal cares and ambulating.  A wheeled walker is necessary due to the patient's unsteady gait, upper body weakness, and inability to  an ambulation device; and she can ambulate only by pushing a walker instead of a lesser assistive device such as a cane, crutch, or standard walker.  The need for this equipment was discussed with the patient and she understands and is in agreement.     Suzi Waggoner MD

## 2025-07-07 NOTE — PROGRESS NOTES
Occupational Therapy  Occupational Therapy Daily Treatment Note  Facility/Department: [address]   Patient Name: Barbie Arevalo        MRN: [id number]    : 1940    Date of Service: 2025    Chief Complaint   Patient presents with    Abdominal Cramping    Decreased appetite     Patient states on Thursday she started having increased decreased appetite, abd cramping. Patient states she was dx with a UTI put on antibiotics on , for UTI, went to [facility] ER on Monday, dx with UTI, given Cipro, then abd cramping, decreased appetite, increased weakness, and some \"memory issues\".      Extremity Weakness     Past Medical History:  has a past medical history of Diabetes 1.5, managed as type 2 (HCC) and Hypertension.  Past Surgical History:  has no past surgical history on file.    Discharge Recommendations  Discharge Recommendations: Patient would benefit from continued therapy after discharge  OT Equipment Recommendations  Equipment Needed: Yes  Mobility Devices: Walker  Walker: Rolling    Assessment  Performance deficits / Impairments: Decreased functional mobility ;Decreased ADL status;Decreased safe awareness;Decreased cognition;Decreased endurance;Decreased balance;Decreased high-level IADLs  Assessment: Pt continues to demonstrate above deficits however, progressing well towards STGs.. At baseline, pt performs ADL tasks independently and performs functional transfers/functional mobility with mod IND using cane. Currently patient is MOD IND with bed mobility and transfers, ambulating with cane and supervision and completing ADLs with supervision. Pt to benefit from continued therapy services while hospitalized and at discharge to maximize pt's safety and independence in performing functional tasks.  Prognosis: Good  REQUIRES OT FOLLOW-UP: Yes  Activity Tolerance  Activity Tolerance: Patient Tolerated treatment well  Safety Devices  Type of Devices: Call light within reach;Nurse

## 2025-07-07 NOTE — PROGRESS NOTES
Physical Therapy  Facility/Department: [address]   Physical Therapy Daily Treatment Note    Patient Name: Barbie Arevalo        MRN: [id number]    : 1940    Date of Service: 2025    Chief Complaint   Patient presents with    Abdominal Cramping    Decreased appetite     Patient states on Thursday she started having increased decreased appetite, abd cramping. Patient states she was dx with a UTI put on antibiotics on , for UTI, went to [facility] ER on Monday, dx with UTI, given Cipro, then abd cramping, decreased appetite, increased weakness, and some \"memory issues\".      Extremity Weakness     Past Medical History:  has a past medical history of Diabetes 1.5, managed as type 2 (HCC) and Hypertension.  Past Surgical History:  has no past surgical history on file.    Discharge Recommendations  Discharge Recommendations: Therapy recommended at discharge  PT Equipment Recommendations  Equipment Needed: Yes  Mobility Devices: Walker  Walker: Rolling  Other: also has cane if needed    Assessment  Body Structures, Functions, Activity Limitations Requiring Skilled Therapeutic Intervention: Decreased functional mobility ;Decreased safe awareness;Decreased endurance    Assessment: The patient was admitted due to SBO with a hx of colectomy 21 years ago. At baseline, the patient lives alone and requires no assistance with ADLs and functional mobility. The patient is independent with transfers, pt ambulated 300ft with rolling walker and supervision, pt negotiated 2 steps with lauren rails and modified independent. Pt performed standing exs x 12-15 reps at rolling walker with SBA. Pt denies having pain/lightheadedness/dizziness. The patient would benefit from acute PT to improve functional independence back to baseline. The patient would be appropriate to return to prior living arrangements.  She reports having good family support if assistance if needed after discharge. Pt will benefit from

## 2025-07-07 NOTE — PROGRESS NOTES
GI Progress notes    7/7/2025   9:01 AM    Name:  Barbie Arevalo  MRN:    [id number]     Acct:     [id number]   Room:  [address] [place] [address] Day: 9     Admit Date: 6/28/2025 11:18 AM  PCP: Harley Peralta MD    Subjective:     C/C:   Chief Complaint   Patient presents with    Abdominal Cramping    Decreased appetite     Patient states on Thursday she started having increased decreased appetite, abd cramping. Patient states she was dx with a UTI put on antibiotics on June 12th, for UTI, went to [facility] ER on Monday, dx with UTI, given Cipro, then abd cramping, decreased appetite, increased weakness, and some \"memory issues\".      Extremity Weakness       Interval History: Status: not changed.     Patient seen and examined.  No acute events overnight.  Denies any abdominal pain, nausea, vomiting.  Tolerating diet well.  No reported BM x 2 days.  + flatus    ROS:  Constitutional: negative for chills, fevers and sweats  Respiratory: negative for cough, dyspnea on exertion, hemoptysis, shortness of breath and wheezing  Cardiovascular: negative for chest pain, chest pressure/discomfort, dyspnea, lower extremity edema and palpitations  Gastrointestinal: negative for abdominal pain, constipation, diarrhea, nausea and vomiting  Neurological: negative for dizziness and headaches    Medications:     Allergies:   Allergies   Allergen Reactions    Bee Venom     Statins Myalgia       Current Meds: psyllium husk-aspartame (METAMUCIL FIBER) packet 1 packet, BID  insulin glargine (LANTUS) injection vial 8 Units, Daily  metoprolol tartrate (LOPRESSOR) tablet 50 mg, BID  pantoprazole (PROTONIX) tablet 40 mg, QAM AC  valsartan (DIOVAN) tablet 80 mg, Daily  potassium chloride (KLOR-CON M) extended release tablet 40 mEq, PRN   Or  potassium bicarb-citric acid (EFFER-K) effervescent tablet 40 mEq, PRN   Or  potassium chloride 10 mEq/100 mL IVPB (Peripheral Line), PRN  diphenhydrAMINE (BENADRYL) tablet 25 mg, Nightly  PRN  insulin lispro (HUMALOG,ADMELOG) injection vial 0-4 Units, 4x Daily AC & HS  glucose chewable tablet 16 g, PRN  dextrose bolus 10% 125 mL, PRN   Or  dextrose bolus 10% 250 mL, PRN  glucagon injection 1 mg, PRN  dextrose 10 % infusion, Continuous PRN  sodium chloride flush 0.9 % injection 5-40 mL, 2 times per day  sodium chloride flush 0.9 % injection 5-40 mL, PRN  0.9 % sodium chloride infusion, PRN  ondansetron (ZOFRAN-ODT) disintegrating tablet 4 mg, Q8H PRN   Or  ondansetron (ZOFRAN) injection 4 mg, Q6H PRN  polyethylene glycol (GLYCOLAX) packet 17 g, Daily PRN  acetaminophen (TYLENOL) tablet 650 mg, Q6H PRN   Or  acetaminophen (TYLENOL) suppository 650 mg, Q6H PRN  metoprolol (LOPRESSOR) injection 5 mg, Q6H PRN        Data:     Code Status:  Full Code    No family history on file.    Social History     Socioeconomic History    Marital status: Unknown     Spouse name: Not on file    Number of children: Not on file    Years of education: Not on file    Highest education level: Not on file   Occupational History    Not on file   Tobacco Use    Smoking status: Never    Smokeless tobacco: Never   Vaping Use    Vaping status: Never Used   Substance and Sexual Activity    Alcohol use: Not on file    Drug use: Never    Sexual activity: Not on file   Other Topics Concern    Not on file   Social History Narrative    Not on file     Social Drivers of Health     Financial Resource Strain: Not on file   Food Insecurity: No Food Insecurity (6/28/2025)    Hunger Vital Sign     Worried About Running Out of Food in the Last Year: Never true     Ran Out of Food in the Last Year: Never true   Transportation Needs: No Transportation Needs (6/28/2025)    PRAPARE - Transportation     Lack of Transportation (Medical): No     Lack of Transportation (Non-Medical): No   Physical Activity: Not on file   Stress: Not on file   Social Connections: Not on file   Intimate Partner Violence: Not on file   Housing Stability: Low Risk

## 2025-07-07 NOTE — PROGRESS NOTES
adhesions.  No evidence of pneumatosis or free air. Recommend surgical consultation.       Physical Examination:       General appearance:  alert, cooperative and no distress  Mental Status:  oriented to person, place and time and normal affect  Lungs:  clear to auscultation bilaterally, normal effort  Heart:  regular rate and rhythm, no murmur  Abdomen: Soft, nontender, distention improving bowel sounds heard..    Extremities:  no edema, redness, tenderness in the calves  Skin:  no gross lesions, rashes, induration    Assessment:     Hospital Problems           Last Modified POA    * (Principal) Small bowel obstruction (HCC) 7/6/2025 Yes    Hypertension 6/28/2025 Yes    Type 2 diabetes mellitus (HCC) 6/28/2025 Yes    Diarrhea 7/5/2025 Yes    Mixed hyperlipidemia 6/28/2025 Yes    Supraventricular premature beats 6/28/2025 Yes       Plan:      84-year-old female history of HTN, T2DM, colectomy for colon cancer status post chemoradiation, IBS, hysterectomy presented with abdominal pain inability to tolerate diet.  Admitted with small bowel obstruction.  NGT was placed, IV hydration started  Patient seen and examined today, blood pressure controlled.  Blood sugars on the higher side adding correctional insulin  Creat 1.0  Tolerating clear liquid diet  Having bowel movements  NG tube was removed on 7/3.  Blood sugars elevated-started on low-dose Lantus.  A1c is 7.    Patient had SBFT on 7/3/2025.  Blood pressure running high-improved with resumption of home medications once patient taking orally      7/6  Patient seen and examined, tolerating regular diet however has not had any bowel movement yesterday.  Passing gas.  Abdomen still somewhat distended but nontender.  No vomiting.  Blood sugars still on the high side-increasing Lantus dose to 8 units in the morning.  Appreciate GI recommendations-started on Metamucil    7/7  Patient reports feeling comfortable, tolerating regular diet no bowel movement since last 1 to 2  days.  Passing gas.  No concern for C. difficile-discontinued  Changing MiraLAX from as needed to daily, add Colace.  Continue Metamucil  Due to get higher dose Lantus today will follow blood sugars  Blood pressure elevated-increasing valsartan dose to 160 Mg daily  Anticipate discharge in 1 to 2 days once she has had a BM.  Appreciate GI and general surgery recommendation      Suzi Waggoner MD  7/7/2025  9:06 AM

## 2025-07-07 NOTE — PLAN OF CARE
Problem: Chronic Conditions and Co-morbidities  Goal: Patient's chronic conditions and co-morbidity symptoms are monitored and maintained or improved  7/7/2025 0036 by Ra Huff LPN  Outcome: Progressing  Flowsheets (Taken 7/6/2025 2000)  Care Plan - Patient's Chronic Conditions and Co-Morbidity Symptoms are Monitored and Maintained or Improved: Monitor and assess patient's chronic conditions and comorbid symptoms for stability, deterioration, or improvement     Problem: Discharge Planning  Goal: Discharge to home or other facility with appropriate resources  7/7/2025 0036 by Ra Huff LPN  Outcome: Progressing  Flowsheets (Taken 7/6/2025 2000)  Discharge to home or other facility with appropriate resources: Identify barriers to discharge with patient and caregiver     Problem: Pain  Goal: Verbalizes/displays adequate comfort level or baseline comfort level  7/7/2025 0036 by Ra Huff LPN  Outcome: Progressing     Problem: Safety - Adult  Goal: Free from fall injury  7/7/2025 0036 by Ra Huff LPN  Outcome: Progressing     Problem: ABCDS Injury Assessment  Goal: Absence of physical injury  7/7/2025 0036 by Ra Huff LPN  Outcome: Progressing     Problem: Skin/Tissue Integrity  Goal: Skin integrity remains intact  Description: 1.  Monitor for areas of redness and/or skin breakdown  2.  Assess vascular access sites hourly  3.  Every 4-6 hours minimum:  Change oxygen saturation probe site  4.  Every 4-6 hours:  If on nasal continuous positive airway pressure, respiratory therapy assess nares and determine need for appliance change or resting period  7/7/2025 0036 by Ra Huff LPN  Outcome: Progressing  Flowsheets (Taken 7/6/2025 2000)  Skin Integrity Remains Intact: Monitor for areas of redness and/or skin breakdown

## 2025-07-08 NOTE — DISCHARGE SUMMARY
pt c/o abdominal pain and cramping FINDINGS: Lack of intravenous contrast limits evaluation of the visceral organs. Lower chest: Trace right pleural effusion with atelectasis. EG junction, stomach and duodenal sweep: Unremarkable Liver: Unremarkable Gallbladder: Cholelithiasis Biliary tree: Unremarkable Pancreas: Unremarkable for patient's age. Spleen: Unremarkable Kidneys and ureters: Simple appearing bilateral renal cysts the largest on the left measuring 3.2 cm.  No additional imaging follow-up is recommended. Adrenal glands: Unremarkable Retroperitoneal structures:  Unremarkable Small bowel and colon: Dilated small bowel loops with air-fluid levels with transition point seen in the pelvis on image 142.  The bowel distal to this is decompressed.  The bowel proximal to this is dilated air-fluid levels and mild mesenteric engorgement suggesting high-grade obstruction.  This is in close proximity to patient's low anterior section suggesting underlying adhesion.  There is a small amount of stool seen throughout the colon.  A few residual sigmoid diverticuli. Appendix: Not seen Urinary bladder: Unremarkable Free fluid/air: None Lymph nodes: No enlarged lymph nodes Osseus structures: No destructive lesion Vasculature: No aneurysm Other: The uterus is surgically absent.     High-grade small bowel obstruction with transition point seen in the pelvis likely related to adhesions.  No evidence of pneumatosis or free air. Recommend surgical consultation.         Consultations:    Consults:     Final Specialist Recommendations/Findings:   IP CONSULT TO GENERAL SURGERY  IP CONSULT TO DIETITIAN  IP CONSULT TO SPIRITUAL SERVICES  IP CONSULT TO GI      The patient was seen and examined on day of discharge and this discharge summary is in conjunction with any daily progress note from day of discharge.    Discharge plan:     Disposition: home       Instructions to Patient: Keep follow-up appointments      Requiring Further  Evaluation/Follow Up POST HOSPITALIZATION/Incidental Findings:     Physician Follow Up:     No follow-up provider specified.     Activity: Resume as directed    Discharge Medications:      Medication List        START taking these medications      docusate 100 MG Caps  Commonly known as: COLACE, DULCOLAX  Take 100 mg by mouth daily     pantoprazole 40 MG tablet  Commonly known as: PROTONIX  Take 1 tablet by mouth every morning (before breakfast)            CHANGE how you take these medications      valsartan 160 MG tablet  Commonly known as: DIOVAN  Take 1 tablet by mouth daily  What changed: Another medication with the same name was removed. Continue taking this medication, and follow the directions you see here.            CONTINUE taking these medications      aspirin 81 MG chewable tablet     bismuth subsalicylate 262 MG/15ML suspension  Commonly known as: Bismatrol  Take 30 mLs by mouth 4 times daily as needed for Indigestion     ezetimibe 10 MG tablet  Commonly known as: ZETIA     glimepiride 2 MG tablet  Commonly known as: AMARYL     hydroCHLOROthiazide 12.5 MG tablet     metoprolol tartrate 50 MG tablet  Commonly known as: LOPRESSOR     polyethylene glycol 17 GM/SCOOP powder  Commonly known as: GLYCOLAX  Take 238 grams of miralax and dissolve in 64 oz of gatorade, drink in 1 day to purge and clean out the colon. Starting next day, take daily miralax 1 capful with 1 glass of water/liquid.            STOP taking these medications      loperamide 2 MG capsule  Commonly known as: IMODIUM               Where to Get Your Medications        These medications were sent to [facility] - [facility] [address] - P [phone] - F [phone]  [address] [place] [address]      Phone: [phone]   docusate 100 MG Caps  pantoprazole 40 MG tablet         Time Spent on discharge is  35 mins in patient examination, evaluation, counseling as well as medication reconciliation, prescriptions for

## 2025-07-14 NOTE — PROGRESS NOTES
Physician Progress Note      PATIENT:               YOBANI SUBRAMANIAN  CSN #:                  [id number]  :                       1940  ADMIT DATE:       2025 11:18 AM  DISCH DATE:        2025 6:00 PM  RESPONDING  PROVIDER #:        Suzi Waggoner MD          QUERY TEXT:    Based on your medical judgment, please clarify these findings and document if   any of the following are being evaluated and/or treated:    The clinical indicators include:  Patient admitted for GI bowel obstruction.Presented to ED with abdominal   cramping poor appetite \"nausea \" noted in H&P. per lab creat 1.7 on admission   down to .8 on . BMP ordered daily during admission. was given 500ml ns   bolus on admission followed by IVF at 75 per hour.  Options provided:  -- Acute kidney failure  -- Acute kidney injury  -- Other - I will add my own diagnosis  -- Disagree - Not applicable / Not valid  -- Disagree - Clinically unable to determine / Unknown  -- Refer to Clinical Documentation Reviewer    PROVIDER RESPONSE TEXT:    This patient has an Acute kidney injury.    Query created by: Jayda Daniels on 2025 7:49 AM      Electronically signed by:  Suzi Waggoner MD 2025 10:32 AM

## 2025-07-30 ENCOUNTER — OFFICE VISIT (OUTPATIENT)
Dept: GASTROENTEROLOGY | Age: 85
End: 2025-07-30
Payer: MEDICARE

## 2025-07-30 VITALS
DIASTOLIC BLOOD PRESSURE: 73 MMHG | TEMPERATURE: 97.2 F | WEIGHT: 149 LBS | HEART RATE: 64 BPM | BODY MASS INDEX: 28.13 KG/M2 | OXYGEN SATURATION: 96 % | SYSTOLIC BLOOD PRESSURE: 142 MMHG | RESPIRATION RATE: 18 BRPM | HEIGHT: 61 IN

## 2025-07-30 DIAGNOSIS — Z85.038 HISTORY OF COLON CANCER: ICD-10-CM

## 2025-07-30 DIAGNOSIS — R19.4 ALTERED BOWEL HABITS: ICD-10-CM

## 2025-07-30 DIAGNOSIS — R53.83 OTHER FATIGUE: Primary | ICD-10-CM

## 2025-07-30 PROCEDURE — 1123F ACP DISCUSS/DSCN MKR DOCD: CPT | Performed by: INTERNAL MEDICINE

## 2025-07-30 PROCEDURE — 1159F MED LIST DOCD IN RCRD: CPT | Performed by: INTERNAL MEDICINE

## 2025-07-30 PROCEDURE — G2211 COMPLEX E/M VISIT ADD ON: HCPCS | Performed by: INTERNAL MEDICINE

## 2025-07-30 PROCEDURE — 3077F SYST BP >= 140 MM HG: CPT | Performed by: INTERNAL MEDICINE

## 2025-07-30 PROCEDURE — 1126F AMNT PAIN NOTED NONE PRSNT: CPT | Performed by: INTERNAL MEDICINE

## 2025-07-30 PROCEDURE — 3078F DIAST BP <80 MM HG: CPT | Performed by: INTERNAL MEDICINE

## 2025-07-30 PROCEDURE — 99214 OFFICE O/P EST MOD 30 MIN: CPT | Performed by: INTERNAL MEDICINE

## 2025-07-30 RX ORDER — FENOFIBRATE 48 MG/1
48 TABLET, FILM COATED ORAL DAILY
COMMUNITY
Start: 2025-06-06

## 2025-07-30 ASSESSMENT — ENCOUNTER SYMPTOMS
WHEEZING: 0
SORE THROAT: 0
TROUBLE SWALLOWING: 0
COLOR CHANGE: 0
DIARRHEA: 1
VOICE CHANGE: 0
CHOKING: 0
NAUSEA: 0
BLOOD IN STOOL: 1
RECTAL PAIN: 0
VOMITING: 0
CONSTIPATION: 0
ABDOMINAL PAIN: 1
COUGH: 0
ANAL BLEEDING: 0
ABDOMINAL DISTENTION: 1

## 2025-07-30 NOTE — PROGRESS NOTES
IMPRESSION: Ms. Arevalo is a 84 y.o. female with a past history remarkable for DM, HTN, colon cancer s/p resection 2004 s/p chemoradiation , referred for evaluation of Diarrhea.     Assessment  1. Other fatigue    2. Altered bowel habits    3. History of colon cancer        -Differentials include but not limited to: IBS vs SIBO vs malabsorption vs radiation vs microscopic colitis vs overflow diarrhea  - Stool studies negative for infection, calprotectin mildly elevated    Plan:  - Will repeat CBC to assess for fatigue.  Will also repeat calprotectin to assess for elevated marker.  If calprotectin is high, the patient will likely need colonoscopy and small bowel capsule endoscopy/enterography.  -Will also recommend completing the hydrogen breath test to rule out SIBO  -MiraLAX daily, can titrate the dose to achieve 1 daily bowel movement.      Barbie was seen today for follow-up from hospital.    Diagnoses and all orders for this visit:    Other fatigue  -     CBC; Future    Altered bowel habits  -     Calprotectin Stool; Future    History of colon cancer                 RTC:6 months    Additional comments:          Thank you for allowing me to participate in the care of Ms. Arevalo. For any further questions please do not hesitate to contact me.      I have reviewed and agree with the MA/LPN ROS please refer to their documentation from today's encounter on a separate note.     Lakisha Montiel MD  Gastroenterology & Hepatology  Office #: [phone]          this note is created with the assistance of a speech recognition program.  While intending to generate a document that actually reflects the content of the visit, the document can still have some errors including those of syntax and sound a like substitutions which may escape proof reading.  It such instances, actual meaning can be extrapolated by contextual diversion.

## 2025-08-07 ENCOUNTER — HOSPITAL ENCOUNTER (OUTPATIENT)
Age: 85
Setting detail: SPECIMEN
Discharge: HOME | End: 2025-08-07

## 2025-08-07 DIAGNOSIS — R53.83 OTHER FATIGUE: ICD-10-CM

## 2025-08-07 LAB
ERYTHROCYTE [DISTWIDTH] IN BLOOD BY AUTOMATED COUNT: 15.3 % (ref 11.8–14.4)
HCT VFR BLD AUTO: 38.4 % (ref 36.3–47.1)
HGB BLD-MCNC: 12.5 G/DL (ref 11.9–15.1)
MCH RBC QN AUTO: 27.1 PG (ref 25.2–33.5)
MCHC RBC AUTO-ENTMCNC: 32.6 G/DL (ref 28.4–34.8)
MCV RBC AUTO: 83.1 FL (ref 82.6–102.9)
NRBC BLD-RTO: 0 PER 100 WBC
PLATELET # BLD AUTO: 279 K/UL (ref 138–453)
PMV BLD AUTO: 10 FL (ref 8.1–13.5)
RBC # BLD AUTO: 4.62 M/UL (ref 3.95–5.11)
WBC OTHER # BLD: 7.8 K/UL (ref 3.5–11.3)

## 2025-08-19 ENCOUNTER — HOSPITAL ENCOUNTER (OUTPATIENT)
Age: 85
Setting detail: SPECIMEN
Discharge: HOME | End: 2025-08-19

## 2025-08-19 DIAGNOSIS — R19.4 ALTERED BOWEL HABITS: ICD-10-CM

## 2025-08-22 LAB — CALPROTECTIN, FECAL: 14 UG/G
